# Patient Record
Sex: MALE | Race: WHITE
[De-identification: names, ages, dates, MRNs, and addresses within clinical notes are randomized per-mention and may not be internally consistent; named-entity substitution may affect disease eponyms.]

---

## 2017-11-29 ENCOUNTER — HOSPITAL ENCOUNTER (EMERGENCY)
Dept: HOSPITAL 74 - JER | Age: 42
Discharge: HOME | End: 2017-11-29
Payer: COMMERCIAL

## 2017-11-29 VITALS — BODY MASS INDEX: 36.2 KG/M2

## 2017-11-29 VITALS — DIASTOLIC BLOOD PRESSURE: 76 MMHG | SYSTOLIC BLOOD PRESSURE: 134 MMHG | HEART RATE: 69 BPM
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
## 2017-11-29 DIAGNOSIS — M54.5: Primary | ICD-10-CM
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
## 2017-11-29 LAB
APPEARANCE UR: CLEAR
BILIRUB UR STRIP.AUTO-MCNC: (no result) MG/DL
COLOR UR: YELLOW
KETONES UR QL STRIP: NEGATIVE
NITRITE UR QL STRIP: NEGATIVE
PH UR: 5.5 [PH] (ref 5–8)
PROT UR QL STRIP: NEGATIVE
PROT UR QL STRIP: NEGATIVE
RBC # UR STRIP: NEGATIVE /UL
SP GR UR: >= 1.03 (ref 1–1.03)
UROBILINOGEN UR STRIP-MCNC: 0.2 MG/DL (ref 0.2–1)

## 2017-11-29 NOTE — PDOC
History of Present Illness





- General


Chief Complaint: Back Pain


Stated Complaint: BACK PAIN


Time Seen by Provider: 11/29/17 05:45


History Source: Patient


Exam Limitations: No Limitations





- History of Present Illness


Initial Comments: 





11/29/17 05:58





43yo Male patient w/ PmHx: Lumbar disc fusion and IBS presents to ED c/o back 

pain. Patient states while at work this morning, he was carrying luggage when 

he lost his balance due to his left leg "giving out." Patient states he was 

able to break his fall, but felt a pop in his back and is worried about the 

hardware. He denies any other complaints at this time.





Orthopedic Surgeon: Dr. Stevens ([facility]).





Occurred: reports: just prior to arrival.  denies: this morning, this afternoon

, this evening, yesterday, last week, other


Severity: reports: moderate.  denies: mild, severe


Pain Location: reports: back.  denies: none, abdomen, chest, face, head, lower 

extremity, mouth, neck, other, pelvis, upper extremity


Method of Injury: Yes: fall.  No: unknown, assault, direct blow, motor vehicle 

crash, other


Modifying Factors: improves with: rest.  worse with: None, cold therapy, 

immobilization, pain medication, other


Associated Symptoms (Fall): denies symptoms





Past History





- Travel


Traveled outside of the country in the last 30 days: No


Close contact w/someone who was outside of country & ill: No





- Past Medical History


Allergies/Adverse Reactions: 


 Allergies











Allergy/AdvReac Type Severity Reaction Status Date / Time


 


No Known Allergies Allergy   Verified 11/29/17 05:14











Home Medications: 


Ambulatory Orders





Fenofibrate [Lipofen] 150 mg PO DAILY 11/29/17 


Insulin (Levemir) [Levemir Vial] 28 unit SQ DAILY 11/29/17 


Metformin HCl [Metformin HCl ER] 1,000 mg PO DAILY 11/29/17 








COPD: No





- Immunization History


Immunization Up to Date: Yes





- Suicide/Smoking/Psychosocial Hx


Smoking History: Never smoked


Have you smoked in the past 12 months: No


Information on smoking cessation initiated: No


Hx Alcohol Use: No


Drug/Substance Use Hx: No


Substance Use Type: None





Trauma Specific PMHX





- Complaint Specific PMHX


Arthritis: No


Back Injury: No


Neck Injury: No


Hx Sacro Iliac Joint Dysfunction: No





**Review of Systems





- Review of Systems


Able to Perform ROS?: Yes


Is the patient limited English proficient: No


Musculoskeletal: Yes: Back Pain


All Other Systems: Reviewed and Negative





*Physical Exam





- Vital Signs


 Last Vital Signs











Temp Pulse Resp BP Pulse Ox


 


    77   14   126/84   98 


 


    11/29/17 05:14  11/29/17 05:14  11/29/17 05:14  11/29/17 05:14














- Physical Exam


General Appearance: Yes: Nourished, Appropriately Dressed, Moderate Distress.  

No: Apparent Distress, Mild Distress, Severe Distress


Neck: positive: Trachea midline, Supple.  negative: Rigid, Lymphadenopathy (R), 

Lymphadenopathy (L), Tender lateral, Tender midline, Thyromegaly


Respiratory/Chest: positive: Lungs Clear, Normal Breath Sounds.  negative: 

Chest Tender, Respiratory Distress, Accessory Muscle Use, Labored Respiration, 

Rapid RR, Paradoxal Breathing, Rhonchi, Stridor, Wheezing


Cardiovascular: positive: Regular Rhythm, Regular Rate


Gastrointestinal/Abdominal: positive: Normal Bowel Sounds, Soft, Distended.  

negative: Protuberent, Guarding, Rebound, Tenderness, Hernia, Mass


Musculoskeletal: positive: Normal Inspection.  negative: CVA Tenderness, 

Vertebral Tenderness


Extremity: positive: Normal Capillary Refill, Normal Inspection, Normal Range 

of Motion.  negative: Pedal Edema, Swelling, Calf Tenderness, Erythema, 

Inflammation


Integumentary: positive: Normal Color, Dry, Warm


Neurologic: positive: CNs II-XII NML intact, Fully Oriented, Alert, Normal Mood/

Affect, Normal Response, Motor Strength 5/5





ED Treatment Course





- RADIOLOGY


Radiology Studies Ordered: 














 Category Date Time Status


 


 SPINE-LUMBAR ONLY [RAD] Stat Radiology  11/29/17 05:57 Ordered

## 2017-11-29 NOTE — PDOC
*Physical Exam





- Vital Signs


 Last Vital Signs











Temp Pulse Resp BP Pulse Ox


 


    77   14   126/84   98 


 


    11/29/17 05:14  11/29/17 05:14  11/29/17 05:14  11/29/17 05:14














- Physical Exam


General Appearance: Yes: Appropriately Dressed.  No: Apparent Distress


HEENT: positive: Normal Voice


Neck: positive: Supple


Respiratory/Chest: negative: Respiratory Distress


Gastrointestinal/Abdominal: positive: Soft.  negative: Tender


Musculoskeletal: positive: Vertebral Tenderness.  negative: CVA Tenderness


Extremity: positive: Normal Inspection


Integumentary: positive: Dry, Warm


Neurologic: positive: Fully Oriented, Alert, Normal Mood/Affect





ED Treatment Course





- Medications


Given in the ED: 


ED Medications














Discontinued Medications














Generic Name Dose Route Start Last Admin





  Trade Name Freq  PRN Reason Stop Dose Admin


 


Methocarbamol  500 mg  11/29/17 05:58  11/29/17 06:29





  Robaxin -  PO  11/29/17 05:59  500 mg





  ONCE ONE   Administration


 


Tramadol HCl  50 mg  11/29/17 05:57  11/29/17 06:29





  Ultram -  PO  11/29/17 05:58  50 mg





  ONCE ONE   Administration














Medical Decision Making





- Medical Decision Making





11/29/17 07:30





Received signout at 7 AM





Patient is a 42-year-old male with chronic back pain status post lumbar fusion 

a year ago resents with acute on chronic back pain in the setting of heavy 

lifting while at work this am.  No evidence of cauda equina on exam as per 

prior team.  Patient given tramadol for pain with x-ray pending. has f/u with  

his ortho spine MD next week





11/29/17 10:09


XR w/ no acute pathology. Pt reports feeling better but sleepy with meds so 

wants to rest in ED a little while. On discharge, will give rx for pain meds. 

has ortho f/u on Tuesday 11/29/17 10:57


Pt ready to be discharged





*DC/Admit/Observation/Transfer


Diagnosis at time of Disposition: 


Back pain


Qualifiers:


 Back pain location: low back pain Chronicity: acute Back pain laterality: 

unspecified Sciatica presence: without sciatica Qualified Code(s): M54.5 - Low 

back pain








- Discharge Dispostion


Disposition: HOME


Condition at time of disposition: Improved





- Prescriptions


Prescriptions: 


Tramadol HCl 50 mg PO Q6H #20 tablet MDD 200mg





- Referrals





- Patient Instructions


Printed Discharge Instructions:  Low Back Pain


Additional Instructions: 


Take medications as directed and follow-up with your orthopedic next week





- Post Discharge Activity


Forms/Work/School Notes:  Back to Work

## 2023-09-07 VITALS
OXYGEN SATURATION: 96 % | SYSTOLIC BLOOD PRESSURE: 131 MMHG | HEART RATE: 88 BPM | RESPIRATION RATE: 16 BRPM | HEIGHT: 64 IN | DIASTOLIC BLOOD PRESSURE: 78 MMHG | WEIGHT: 221.12 LBS | TEMPERATURE: 98 F

## 2023-09-07 RX ORDER — INFLUENZA VIRUS VACCINE 15; 15; 15; 15 UG/.5ML; UG/.5ML; UG/.5ML; UG/.5ML
0.5 SUSPENSION INTRAMUSCULAR ONCE
Refills: 0 | Status: DISCONTINUED | OUTPATIENT
Start: 2023-09-08 | End: 2023-09-15

## 2023-09-07 RX ORDER — POVIDONE-IODINE 5 %
1 AEROSOL (ML) TOPICAL ONCE
Refills: 0 | Status: COMPLETED | OUTPATIENT
Start: 2023-09-08 | End: 2023-09-08

## 2023-09-07 NOTE — H&P ADULT - HISTORY OF PRESENT ILLNESS
48yoM with right knee pain x  Presents today for right TKA  48yoM with right knee pain x 6 years. He says that he was injured at work in 2017 when working at a new [employer] learning how to work the [employer]. He had a scope of his knee within the 2 years after his injury and reports it did not help. He says that he came to Dr. Butcher as a second opinion who recommended a knee replacement. He ambulates without the use of a cane or walker. Pain persists despite conservative management. He takes extra strength Tylenol for  pain and oxycodone 5mg every so often for severe pain. He has a pain management doctor that he last say 1 day ago who manages his back pain from history of spinal fusions, hip pain and knee pain. Denies numbness or tingling of the right leg. He reports numbness in the left leg since his spinal fusion.     History of stents placed may 2020 on prasugrel 10mg last dose was taken 5 days ago per cards and pcp.     Presents today for right TKA  48yoM with right knee pain x 6 years. 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 He had a scope of his knee within the 2 years after his injury and reports it did not help. He says that he came to Dr. Butcher as a second opinion who recommended a knee replacement. He ambulates without the use of a cane or walker. Pain persists despite conservative management. He takes extra strength Tylenol for  pain and oxycodone 5mg every so often for severe pain. He has a pain management doctor that he last say 1 day ago who manages his back pain from history of spinal fusions, hip pain and knee pain. Denies numbness or tingling of the right leg. He reports numbness in the left leg since his spinal fusion.     History of stents placed may 2020 on prasugrel 10mg last dose was taken 5 days ago per cards and pcp. Denies history of seizures or blood clots. Denies chest pain, sob or nausea this morning.     Presents today for right TKA

## 2023-09-07 NOTE — H&P ADULT - NSHPSOCIALHISTORY_GEN_ALL_CORE
denies smoking history, recreational drug use and etoh use. reports he lives in a house with a smoker and expose to second hand smoke.

## 2023-09-07 NOTE — H&P ADULT - NSICDXPASTMEDICALHX_GEN_ALL_CORE_FT
PAST MEDICAL HISTORY:  Anxiety & depression    CAD (coronary artery disease) 2 stents    Diabetes     GERD (gastroesophageal reflux disease)     Morbid obesity

## 2023-09-07 NOTE — H&P ADULT - NSHPPHYSICALEXAM_GEN_ALL_CORE
PE: Decreased ROM to right knee secondary to pain    Rest of PE per medical clearance Gen: 48M NAD  MSK: Decreased right knee ROM secondary to pain  Skin without erythema, ecchymosis, abrasions or lesions  Calves soft, nontender bilaterally   Sensation intact to patient baseline reports decrease sensation of left leg from back to toes from prior spinal fusion  Pulses: +2DP palpable, brisk capillary refill  EHL/FHL/TA/GS 4+/5 bilaterally limited 2/2 pain     Rest of PE per MD clearance

## 2023-09-07 NOTE — H&P ADULT - NSHPLABSRESULTS_GEN_ALL_CORE
Preop CBC, BMP, PT/PTT/INR, UA within normal limits- reviewed by medical clearance.   Cr 1.11 preop  A1C 7.8 preop  Preop EKG: NSR, reviewed by medical clearance.   Spirometry: WNL per medical clearance    DOS

## 2023-09-07 NOTE — PATIENT PROFILE ADULT - FALL HARM RISK - UNIVERSAL INTERVENTIONS
Bed in lowest position, wheels locked, appropriate side rails in place/Call bell, personal items and telephone in reach/Instruct patient to call for assistance before getting out of bed or chair/Non-slip footwear when patient is out of bed/Thomas to call system/Physically safe environment - no spills, clutter or unnecessary equipment/Purposeful Proactive Rounding/Room/bathroom lighting operational, light cord in reach

## 2023-09-07 NOTE — H&P ADULT - NSICDXPASTSURGICALHX_GEN_ALL_CORE_FT
PAST SURGICAL HISTORY:  Deviated septum     H/O arthroscopy of knee right    H/O hernia repair     H/O lumbosacral spine surgery     History of cholecystectomy     History of tonsillectomy     S/P arthroscopy of left shoulder

## 2023-09-07 NOTE — H&P ADULT - PROBLEM SELECTOR PLAN 1
Admit to Orthopaedic Service.  Presents today for elective right TKA   Pt medically stable and cleared for procedure today by Dr. Galvez, Dr. Doss  Pt last took Effient on ______ Admit to Orthopaedic Service.  Presents today for elective right TKA   Pt medically stable and cleared for procedure today by Dr. Galvez, Dr. Doss  Pt last took Effient on 9/2/23

## 2023-09-08 ENCOUNTER — INPATIENT (INPATIENT)
Facility: HOSPITAL | Age: 48
LOS: 6 days | Discharge: HOME HEALTH | DRG: 470 | End: 2023-09-15
Attending: ORTHOPAEDIC SURGERY | Admitting: ORTHOPAEDIC SURGERY
Payer: OTHER MISCELLANEOUS

## 2023-09-08 DIAGNOSIS — Z98.890 OTHER SPECIFIED POSTPROCEDURAL STATES: Chronic | ICD-10-CM

## 2023-09-08 DIAGNOSIS — Z90.89 ACQUIRED ABSENCE OF OTHER ORGANS: Chronic | ICD-10-CM

## 2023-09-08 DIAGNOSIS — M19.90 UNSPECIFIED OSTEOARTHRITIS, UNSPECIFIED SITE: ICD-10-CM

## 2023-09-08 DIAGNOSIS — Z90.49 ACQUIRED ABSENCE OF OTHER SPECIFIED PARTS OF DIGESTIVE TRACT: Chronic | ICD-10-CM

## 2023-09-08 DIAGNOSIS — K21.9 GASTRO-ESOPHAGEAL REFLUX DISEASE WITHOUT ESOPHAGITIS: ICD-10-CM

## 2023-09-08 DIAGNOSIS — J34.2 DEVIATED NASAL SEPTUM: Chronic | ICD-10-CM

## 2023-09-08 DIAGNOSIS — I25.10 ATHEROSCLEROTIC HEART DISEASE OF NATIVE CORONARY ARTERY WITHOUT ANGINA PECTORIS: ICD-10-CM

## 2023-09-08 DIAGNOSIS — E11.9 TYPE 2 DIABETES MELLITUS WITHOUT COMPLICATIONS: ICD-10-CM

## 2023-09-08 DIAGNOSIS — F41.9 ANXIETY DISORDER, UNSPECIFIED: ICD-10-CM

## 2023-09-08 DIAGNOSIS — M17.11 UNILATERAL PRIMARY OSTEOARTHRITIS, RIGHT KNEE: ICD-10-CM

## 2023-09-08 LAB
GLUCOSE BLDC GLUCOMTR-MCNC: 126 MG/DL — HIGH (ref 70–99)
GLUCOSE BLDC GLUCOMTR-MCNC: 149 MG/DL — HIGH (ref 70–99)
GLUCOSE BLDC GLUCOMTR-MCNC: 154 MG/DL — HIGH (ref 70–99)
GLUCOSE BLDC GLUCOMTR-MCNC: 199 MG/DL — HIGH (ref 70–99)

## 2023-09-08 PROCEDURE — 73560 X-RAY EXAM OF KNEE 1 OR 2: CPT | Mod: 26,RT

## 2023-09-08 DEVICE — BASEPLATE TIB TRIATHLON TRITAN SZ 4: Type: IMPLANTABLE DEVICE | Site: RIGHT | Status: FUNCTIONAL

## 2023-09-08 DEVICE — MAKO BONE PIN 4MM X 110MM: Type: IMPLANTABLE DEVICE | Site: RIGHT | Status: FUNCTIONAL

## 2023-09-08 DEVICE — INSERT TIB BEARING CS X3 SZ 4 9MM: Type: IMPLANTABLE DEVICE | Site: RIGHT | Status: FUNCTIONAL

## 2023-09-08 DEVICE — COMP FEM TRIATHLON CR SZ3 RT: Type: IMPLANTABLE DEVICE | Site: RIGHT | Status: FUNCTIONAL

## 2023-09-08 RX ORDER — ATORVASTATIN CALCIUM 80 MG/1
40 TABLET, FILM COATED ORAL AT BEDTIME
Refills: 0 | Status: DISCONTINUED | OUTPATIENT
Start: 2023-09-08 | End: 2023-09-15

## 2023-09-08 RX ORDER — DEXTROSE 50 % IN WATER 50 %
25 SYRINGE (ML) INTRAVENOUS ONCE
Refills: 0 | Status: DISCONTINUED | OUTPATIENT
Start: 2023-09-08 | End: 2023-09-08

## 2023-09-08 RX ORDER — HYDROMORPHONE HYDROCHLORIDE 2 MG/ML
0.5 INJECTION INTRAMUSCULAR; INTRAVENOUS; SUBCUTANEOUS
Refills: 0 | Status: DISCONTINUED | OUTPATIENT
Start: 2023-09-08 | End: 2023-09-11

## 2023-09-08 RX ORDER — FENOFIBRATE,MICRONIZED 130 MG
1 CAPSULE ORAL
Refills: 0 | DISCHARGE

## 2023-09-08 RX ORDER — INSULIN LISPRO 100/ML
VIAL (ML) SUBCUTANEOUS
Refills: 0 | Status: DISCONTINUED | OUTPATIENT
Start: 2023-09-08 | End: 2023-09-08

## 2023-09-08 RX ORDER — OXYCODONE HYDROCHLORIDE 5 MG/1
10 TABLET ORAL EVERY 4 HOURS
Refills: 0 | Status: DISCONTINUED | OUTPATIENT
Start: 2023-09-08 | End: 2023-09-10

## 2023-09-08 RX ORDER — SODIUM CHLORIDE 9 MG/ML
1000 INJECTION, SOLUTION INTRAVENOUS
Refills: 0 | Status: DISCONTINUED | OUTPATIENT
Start: 2023-09-08 | End: 2023-09-08

## 2023-09-08 RX ORDER — CELECOXIB 200 MG/1
200 CAPSULE ORAL EVERY 12 HOURS
Refills: 0 | Status: DISCONTINUED | OUTPATIENT
Start: 2023-09-09 | End: 2023-09-15

## 2023-09-08 RX ORDER — GLUCAGON INJECTION, SOLUTION 0.5 MG/.1ML
1 INJECTION, SOLUTION SUBCUTANEOUS ONCE
Refills: 0 | Status: DISCONTINUED | OUTPATIENT
Start: 2023-09-08 | End: 2023-09-15

## 2023-09-08 RX ORDER — POLYETHYLENE GLYCOL 3350 17 G/17G
17 POWDER, FOR SOLUTION ORAL AT BEDTIME
Refills: 0 | Status: DISCONTINUED | OUTPATIENT
Start: 2023-09-08 | End: 2023-09-15

## 2023-09-08 RX ORDER — SENNA PLUS 8.6 MG/1
2 TABLET ORAL AT BEDTIME
Refills: 0 | Status: DISCONTINUED | OUTPATIENT
Start: 2023-09-08 | End: 2023-09-15

## 2023-09-08 RX ORDER — INSULIN LISPRO 100/ML
VIAL (ML) SUBCUTANEOUS
Refills: 0 | Status: DISCONTINUED | OUTPATIENT
Start: 2023-09-08 | End: 2023-09-15

## 2023-09-08 RX ORDER — ONDANSETRON 8 MG/1
4 TABLET, FILM COATED ORAL EVERY 6 HOURS
Refills: 0 | Status: DISCONTINUED | OUTPATIENT
Start: 2023-09-08 | End: 2023-09-15

## 2023-09-08 RX ORDER — DEXTROSE 50 % IN WATER 50 %
25 SYRINGE (ML) INTRAVENOUS ONCE
Refills: 0 | Status: DISCONTINUED | OUTPATIENT
Start: 2023-09-08 | End: 2023-09-15

## 2023-09-08 RX ORDER — HYDROMORPHONE HYDROCHLORIDE 2 MG/ML
0.5 INJECTION INTRAMUSCULAR; INTRAVENOUS; SUBCUTANEOUS
Refills: 0 | Status: DISCONTINUED | OUTPATIENT
Start: 2023-09-08 | End: 2023-09-10

## 2023-09-08 RX ORDER — ACETAMINOPHEN 500 MG
1000 TABLET ORAL EVERY 8 HOURS
Refills: 0 | Status: DISCONTINUED | OUTPATIENT
Start: 2023-09-08 | End: 2023-09-15

## 2023-09-08 RX ORDER — PRASUGREL 5 MG/1
10 TABLET, FILM COATED ORAL DAILY
Refills: 0 | Status: DISCONTINUED | OUTPATIENT
Start: 2023-09-09 | End: 2023-09-15

## 2023-09-08 RX ORDER — PANTOPRAZOLE SODIUM 20 MG/1
40 TABLET, DELAYED RELEASE ORAL
Refills: 0 | Status: DISCONTINUED | OUTPATIENT
Start: 2023-09-08 | End: 2023-09-15

## 2023-09-08 RX ORDER — ASPIRIN/CALCIUM CARB/MAGNESIUM 324 MG
81 TABLET ORAL DAILY
Refills: 0 | Status: DISCONTINUED | OUTPATIENT
Start: 2023-09-09 | End: 2023-09-15

## 2023-09-08 RX ORDER — MAGNESIUM HYDROXIDE 400 MG/1
30 TABLET, CHEWABLE ORAL DAILY
Refills: 0 | Status: DISCONTINUED | OUTPATIENT
Start: 2023-09-08 | End: 2023-09-15

## 2023-09-08 RX ORDER — INSULIN DETEMIR 100/ML (3)
80 INSULIN PEN (ML) SUBCUTANEOUS AT BEDTIME
Refills: 0 | Status: DISCONTINUED | OUTPATIENT
Start: 2023-09-08 | End: 2023-09-11

## 2023-09-08 RX ORDER — OXYCODONE HYDROCHLORIDE 5 MG/1
5 TABLET ORAL EVERY 4 HOURS
Refills: 0 | Status: DISCONTINUED | OUTPATIENT
Start: 2023-09-08 | End: 2023-09-11

## 2023-09-08 RX ORDER — DEXTROSE 50 % IN WATER 50 %
15 SYRINGE (ML) INTRAVENOUS ONCE
Refills: 0 | Status: DISCONTINUED | OUTPATIENT
Start: 2023-09-08 | End: 2023-09-15

## 2023-09-08 RX ORDER — ACETAMINOPHEN 500 MG
1000 TABLET ORAL ONCE
Refills: 0 | Status: COMPLETED | OUTPATIENT
Start: 2023-09-08 | End: 2023-09-08

## 2023-09-08 RX ORDER — HYDROMORPHONE HYDROCHLORIDE 2 MG/ML
0.5 INJECTION INTRAMUSCULAR; INTRAVENOUS; SUBCUTANEOUS EVERY 4 HOURS
Refills: 0 | Status: COMPLETED | OUTPATIENT
Start: 2023-09-08 | End: 2023-09-15

## 2023-09-08 RX ORDER — CHLORHEXIDINE GLUCONATE 213 G/1000ML
1 SOLUTION TOPICAL ONCE
Refills: 0 | Status: COMPLETED | OUTPATIENT
Start: 2023-09-08 | End: 2023-09-08

## 2023-09-08 RX ORDER — OMEPRAZOLE 10 MG/1
1 CAPSULE, DELAYED RELEASE ORAL
Refills: 0 | DISCHARGE

## 2023-09-08 RX ORDER — CEFAZOLIN SODIUM 1 G
2000 VIAL (EA) INJECTION EVERY 8 HOURS
Refills: 0 | Status: COMPLETED | OUTPATIENT
Start: 2023-09-08 | End: 2023-09-09

## 2023-09-08 RX ORDER — HYDROMORPHONE HYDROCHLORIDE 2 MG/ML
0.5 INJECTION INTRAMUSCULAR; INTRAVENOUS; SUBCUTANEOUS EVERY 4 HOURS
Refills: 0 | Status: DISCONTINUED | OUTPATIENT
Start: 2023-09-08 | End: 2023-09-08

## 2023-09-08 RX ORDER — DEXTROSE 50 % IN WATER 50 %
15 SYRINGE (ML) INTRAVENOUS ONCE
Refills: 0 | Status: DISCONTINUED | OUTPATIENT
Start: 2023-09-08 | End: 2023-09-08

## 2023-09-08 RX ORDER — SODIUM CHLORIDE 9 MG/ML
1000 INJECTION, SOLUTION INTRAVENOUS
Refills: 0 | Status: DISCONTINUED | OUTPATIENT
Start: 2023-09-08 | End: 2023-09-15

## 2023-09-08 RX ORDER — DEXTROSE 50 % IN WATER 50 %
12.5 SYRINGE (ML) INTRAVENOUS ONCE
Refills: 0 | Status: DISCONTINUED | OUTPATIENT
Start: 2023-09-08 | End: 2023-09-15

## 2023-09-08 RX ORDER — SODIUM CHLORIDE 9 MG/ML
1000 INJECTION, SOLUTION INTRAVENOUS
Refills: 0 | Status: DISCONTINUED | OUTPATIENT
Start: 2023-09-09 | End: 2023-09-11

## 2023-09-08 RX ORDER — AMLODIPINE BESYLATE 2.5 MG/1
5 TABLET ORAL DAILY
Refills: 0 | Status: DISCONTINUED | OUTPATIENT
Start: 2023-09-08 | End: 2023-09-15

## 2023-09-08 RX ORDER — ACETAMINOPHEN 500 MG
1000 TABLET ORAL ONCE
Refills: 0 | Status: DISCONTINUED | OUTPATIENT
Start: 2023-09-08 | End: 2023-09-08

## 2023-09-08 RX ORDER — DEXTROSE 50 % IN WATER 50 %
12.5 SYRINGE (ML) INTRAVENOUS ONCE
Refills: 0 | Status: DISCONTINUED | OUTPATIENT
Start: 2023-09-08 | End: 2023-09-08

## 2023-09-08 RX ORDER — ACETAMINOPHEN 500 MG
1000 TABLET ORAL EVERY 8 HOURS
Refills: 0 | Status: DISCONTINUED | OUTPATIENT
Start: 2023-09-08 | End: 2023-09-08

## 2023-09-08 RX ADMIN — OXYCODONE HYDROCHLORIDE 10 MILLIGRAM(S): 5 TABLET ORAL at 17:35

## 2023-09-08 RX ADMIN — OXYCODONE HYDROCHLORIDE 10 MILLIGRAM(S): 5 TABLET ORAL at 20:06

## 2023-09-08 RX ADMIN — HYDROMORPHONE HYDROCHLORIDE 0.5 MILLIGRAM(S): 2 INJECTION INTRAMUSCULAR; INTRAVENOUS; SUBCUTANEOUS at 18:09

## 2023-09-08 RX ADMIN — HYDROMORPHONE HYDROCHLORIDE 0.5 MILLIGRAM(S): 2 INJECTION INTRAMUSCULAR; INTRAVENOUS; SUBCUTANEOUS at 21:53

## 2023-09-08 RX ADMIN — Medication 100 MILLIGRAM(S): at 15:56

## 2023-09-08 RX ADMIN — CHLORHEXIDINE GLUCONATE 1 APPLICATION(S): 213 SOLUTION TOPICAL at 07:36

## 2023-09-08 RX ADMIN — OXYCODONE HYDROCHLORIDE 10 MILLIGRAM(S): 5 TABLET ORAL at 17:03

## 2023-09-08 RX ADMIN — HYDROMORPHONE HYDROCHLORIDE 0.5 MILLIGRAM(S): 2 INJECTION INTRAMUSCULAR; INTRAVENOUS; SUBCUTANEOUS at 22:10

## 2023-09-08 RX ADMIN — Medication 400 MILLIGRAM(S): at 14:37

## 2023-09-08 RX ADMIN — Medication 2: at 13:04

## 2023-09-08 RX ADMIN — Medication 1 APPLICATION(S): at 07:36

## 2023-09-08 RX ADMIN — OXYCODONE HYDROCHLORIDE 10 MILLIGRAM(S): 5 TABLET ORAL at 21:10

## 2023-09-08 RX ADMIN — Medication 1000 MILLIGRAM(S): at 15:13

## 2023-09-08 RX ADMIN — HYDROMORPHONE HYDROCHLORIDE 0.5 MILLIGRAM(S): 2 INJECTION INTRAMUSCULAR; INTRAVENOUS; SUBCUTANEOUS at 18:25

## 2023-09-08 NOTE — PRE-ANESTHESIA EVALUATION ADULT - NSANTHOSAYNRD_GEN_A_CORE
No. ALEXA screening performed.  STOP BANG Legend: 0-2 = LOW Risk; 3-4 = INTERMEDIATE Risk; 5-8 = HIGH Risk

## 2023-09-08 NOTE — PRE-ANESTHESIA EVALUATION ADULT - NSANTHAIRWAYFT_ENT_ALL_CORE
Inter-incisor distance: > 3cm  HMD: > 6cm  Submandibular space: normal compliance, large amount of excess tissue  Neck:  normal length and thickness  Head/Neck:  limited ROR

## 2023-09-08 NOTE — PHYSICAL THERAPY INITIAL EVALUATION ADULT - MANUAL MUSCLE TESTING RESULTS, REHAB EVAL
LLE grossly 5/5, right DF/PF grossly 4/5, right hip flexion 3+/5, did not MMT right knee d/t post-operative status

## 2023-09-08 NOTE — PHYSICAL THERAPY INITIAL EVALUATION ADULT - ADDITIONAL COMMENTS
Prior to admission pt was IND with short distance ambulation, previously limited d/t knee and back pain. Patient lives in a house with his spouse and 5 steps to enter, walk in shower. Does not own any DME.

## 2023-09-08 NOTE — PRE-ANESTHESIA EVALUATION ADULT - NSANTHADDINFOFT_GEN_ALL_CORE
pt on prasugrel, stopped 5 days ago.  Spinal anesthetic contraindicated.  Pt also prefers general anesthesia

## 2023-09-08 NOTE — PHYSICAL THERAPY INITIAL EVALUATION ADULT - GENERAL OBSERVATIONS, REHAB EVAL
Patient received semi-lyn in bed in NAD on 2L NC, +SCDs, +tele, +Heplock. Cleared by MICHAEL Haro. Agreeable to PT.

## 2023-09-08 NOTE — PHYSICAL THERAPY INITIAL EVALUATION ADULT - WEIGHT-BEARING RESTRICTIONS: GAIT, REHAB EVAL
however, patient self selecting to perform toe touch WBing d/t pain; constant cueing to promote increased WBing through RLE/weight-bearing as tolerated

## 2023-09-08 NOTE — PROGRESS NOTE ADULT - SUBJECTIVE AND OBJECTIVE BOX
Ortho Post Op Check    Procedure: Right TKR  Surgeon: Dr. Butcher    Patient seen and examined in PACU bed. Pt endorsing incisional pain.   Denies CP, SOB, N/V, numbness/tingling     Vital Signs Last 24 Hrs  T(C): 36.2 (09-08-23 @ 11:35), Max: 36.2 (09-08-23 @ 11:35)  T(F): 97.2 (09-08-23 @ 11:35), Max: 97.2 (09-08-23 @ 11:35)  HR: 88 (09-08-23 @ 14:10) (88 - 114)  BP: 119/69 (09-08-23 @ 14:10) (107/65 - 152/79)  BP(mean): 89 (09-08-23 @ 14:10) (82 - 107)  RR: 18 (09-08-23 @ 14:10) (13 - 18)  SpO2: 97% (09-08-23 @ 14:10) (94% - 99%)  I&O's Summary      General: Pt Alert and oriented, NAD  DSG C/D/I - Aquacel to right knee, ice packs in place  Pulses: 2+ DP pulses palpable bilaterally, skin wwp, cap refill brisk.   Sensation: Decreased sensation to left lower extremity which is consistent with pre-op exam. Otherwise SILT.   Motor: EHL/FHL/TA/GS 5/5 bilaterally              Post-op X-Ray: IMPRESSION: Frontal and lateral views of the right knee demonstrates total arthroplasty in appropriate alignment with overlying surgical staples with expected air within the surrounding soft tissues    A/P: 47yo Male POD#0 s/p Right TKR  - Stable  - Per anesthesia, admit to tele for likely undiagnosed ALEXA. Patient sating 95% on 4L NC  - Pain Control  - DVT ppx: ASA and Prasugrel POD#1  - Post op abx: Ancef  - PT, WBS: WBAT  - Dispo: Pending PT eval    Ortho Pager [phone]

## 2023-09-08 NOTE — PHYSICAL THERAPY INITIAL EVALUATION ADULT - PERTINENT HX OF CURRENT PROBLEM, REHAB EVAL
Patient is a 47 yo M with right knee pain x 6 years. 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 He had a scope of his knee within the 2 years after his injury and reports it did not help. He says that he came to Dr. Butcher as a second opinion who recommended a knee replacement. He ambulates without the use of a cane or walker. Now POD #0 s/p right total knee arthroplasty on 9/8/23.

## 2023-09-09 LAB
A1C WITH ESTIMATED AVERAGE GLUCOSE RESULT: 7.5 % — HIGH (ref 4–5.6)
ANION GAP SERPL CALC-SCNC: 7 MMOL/L — SIGNIFICANT CHANGE UP (ref 5–17)
BUN SERPL-MCNC: 14 MG/DL — SIGNIFICANT CHANGE UP (ref 7–23)
CALCIUM SERPL-MCNC: 8.6 MG/DL — SIGNIFICANT CHANGE UP (ref 8.4–10.5)
CHLORIDE SERPL-SCNC: 101 MMOL/L — SIGNIFICANT CHANGE UP (ref 96–108)
CO2 SERPL-SCNC: 29 MMOL/L — SIGNIFICANT CHANGE UP (ref 22–31)
CREAT SERPL-MCNC: 0.99 MG/DL — SIGNIFICANT CHANGE UP (ref 0.5–1.3)
EGFR: 95 ML/MIN/1.73M2 — SIGNIFICANT CHANGE UP
ESTIMATED AVERAGE GLUCOSE: 169 MG/DL — HIGH (ref 68–114)
GLUCOSE BLDC GLUCOMTR-MCNC: 142 MG/DL — HIGH (ref 70–99)
GLUCOSE BLDC GLUCOMTR-MCNC: 152 MG/DL — HIGH (ref 70–99)
GLUCOSE BLDC GLUCOMTR-MCNC: 178 MG/DL — HIGH (ref 70–99)
GLUCOSE BLDC GLUCOMTR-MCNC: 187 MG/DL — HIGH (ref 70–99)
GLUCOSE SERPL-MCNC: 166 MG/DL — HIGH (ref 70–99)
HCT VFR BLD CALC: 37.9 % — LOW (ref 39–50)
HGB BLD-MCNC: 12.5 G/DL — LOW (ref 13–17)
MCHC RBC-ENTMCNC: 27.7 PG — SIGNIFICANT CHANGE UP (ref 27–34)
MCHC RBC-ENTMCNC: 33 GM/DL — SIGNIFICANT CHANGE UP (ref 32–36)
MCV RBC AUTO: 84 FL — SIGNIFICANT CHANGE UP (ref 80–100)
NRBC # BLD: 0 /100 WBCS — SIGNIFICANT CHANGE UP (ref 0–0)
PLATELET # BLD AUTO: 203 K/UL — SIGNIFICANT CHANGE UP (ref 150–400)
POTASSIUM SERPL-MCNC: 4.1 MMOL/L — SIGNIFICANT CHANGE UP (ref 3.5–5.3)
POTASSIUM SERPL-SCNC: 4.1 MMOL/L — SIGNIFICANT CHANGE UP (ref 3.5–5.3)
RBC # BLD: 4.51 M/UL — SIGNIFICANT CHANGE UP (ref 4.2–5.8)
RBC # FLD: 13.4 % — SIGNIFICANT CHANGE UP (ref 10.3–14.5)
SODIUM SERPL-SCNC: 137 MMOL/L — SIGNIFICANT CHANGE UP (ref 135–145)
WBC # BLD: 9.98 K/UL — SIGNIFICANT CHANGE UP (ref 3.8–10.5)
WBC # FLD AUTO: 9.98 K/UL — SIGNIFICANT CHANGE UP (ref 3.8–10.5)

## 2023-09-09 RX ADMIN — HYDROMORPHONE HYDROCHLORIDE 0.5 MILLIGRAM(S): 2 INJECTION INTRAMUSCULAR; INTRAVENOUS; SUBCUTANEOUS at 01:40

## 2023-09-09 RX ADMIN — HYDROMORPHONE HYDROCHLORIDE 0.5 MILLIGRAM(S): 2 INJECTION INTRAMUSCULAR; INTRAVENOUS; SUBCUTANEOUS at 22:00

## 2023-09-09 RX ADMIN — HYDROMORPHONE HYDROCHLORIDE 0.5 MILLIGRAM(S): 2 INJECTION INTRAMUSCULAR; INTRAVENOUS; SUBCUTANEOUS at 14:45

## 2023-09-09 RX ADMIN — HYDROMORPHONE HYDROCHLORIDE 0.5 MILLIGRAM(S): 2 INJECTION INTRAMUSCULAR; INTRAVENOUS; SUBCUTANEOUS at 17:56

## 2023-09-09 RX ADMIN — PANTOPRAZOLE SODIUM 40 MILLIGRAM(S): 20 TABLET, DELAYED RELEASE ORAL at 06:23

## 2023-09-09 RX ADMIN — AMLODIPINE BESYLATE 5 MILLIGRAM(S): 2.5 TABLET ORAL at 06:23

## 2023-09-09 RX ADMIN — HYDROMORPHONE HYDROCHLORIDE 0.5 MILLIGRAM(S): 2 INJECTION INTRAMUSCULAR; INTRAVENOUS; SUBCUTANEOUS at 18:20

## 2023-09-09 RX ADMIN — Medication 1000 MILLIGRAM(S): at 14:18

## 2023-09-09 RX ADMIN — HYDROMORPHONE HYDROCHLORIDE 0.5 MILLIGRAM(S): 2 INJECTION INTRAMUSCULAR; INTRAVENOUS; SUBCUTANEOUS at 06:37

## 2023-09-09 RX ADMIN — HYDROMORPHONE HYDROCHLORIDE 0.5 MILLIGRAM(S): 2 INJECTION INTRAMUSCULAR; INTRAVENOUS; SUBCUTANEOUS at 21:12

## 2023-09-09 RX ADMIN — HYDROMORPHONE HYDROCHLORIDE 0.5 MILLIGRAM(S): 2 INJECTION INTRAMUSCULAR; INTRAVENOUS; SUBCUTANEOUS at 09:50

## 2023-09-09 RX ADMIN — CELECOXIB 200 MILLIGRAM(S): 200 CAPSULE ORAL at 17:59

## 2023-09-09 RX ADMIN — SODIUM CHLORIDE 100 MILLILITER(S): 9 INJECTION, SOLUTION INTRAVENOUS at 01:30

## 2023-09-09 RX ADMIN — PRASUGREL 10 MILLIGRAM(S): 5 TABLET, FILM COATED ORAL at 14:31

## 2023-09-09 RX ADMIN — Medication 81 MILLIGRAM(S): at 14:15

## 2023-09-09 RX ADMIN — HYDROMORPHONE HYDROCHLORIDE 0.5 MILLIGRAM(S): 2 INJECTION INTRAMUSCULAR; INTRAVENOUS; SUBCUTANEOUS at 01:24

## 2023-09-09 RX ADMIN — HYDROMORPHONE HYDROCHLORIDE 0.5 MILLIGRAM(S): 2 INJECTION INTRAMUSCULAR; INTRAVENOUS; SUBCUTANEOUS at 10:15

## 2023-09-09 RX ADMIN — HYDROMORPHONE HYDROCHLORIDE 0.5 MILLIGRAM(S): 2 INJECTION INTRAMUSCULAR; INTRAVENOUS; SUBCUTANEOUS at 14:15

## 2023-09-09 RX ADMIN — HYDROMORPHONE HYDROCHLORIDE 0.5 MILLIGRAM(S): 2 INJECTION INTRAMUSCULAR; INTRAVENOUS; SUBCUTANEOUS at 06:22

## 2023-09-09 RX ADMIN — Medication 100 MILLIGRAM(S): at 01:23

## 2023-09-09 NOTE — DISCHARGE NOTE PROVIDER - NSDCCPTREATMENT_GEN_ALL_CORE_FT
PRINCIPAL PROCEDURE  Procedure: Right total knee replacement  Findings and Treatment: OA      SECONDARY PROCEDURE  Procedure: Right total knee replacement  Findings and Treatment:

## 2023-09-09 NOTE — PROGRESS NOTE ADULT - SUBJECTIVE AND OBJECTIVE BOX
Ortho Note    Pt comfortable without complaints, pain controlled  Denies CP, SOB, N/V, numbness/tingling     Vital Signs Last 24 Hrs  T(C): 37.4 (09-09-23 @ 01:15), Max: 37.4 (09-09-23 @ 01:15)  T(F): 99.3 (09-09-23 @ 01:15), Max: 99.3 (09-09-23 @ 01:15)  HR: 80 (09-09-23 @ 01:15) (80 - 98)  BP: 141/84 (09-09-23 @ 01:15) (126/66 - 141/84)  BP(mean): --  RR: 19 (09-09-23 @ 01:15) (16 - 19)  SpO2: 100% (09-09-23 @ 01:15) (97% - 100%)  I&O's Summary    08 Sep 2023 07:01  -  09 Sep 2023 02:04  --------------------------------------------------------  IN: 900 mL / OUT: 575 mL / NET: 325 mL      General: Pt Alert and oriented, NAD  DSG C/D/I - Aquacel to right knee, ice packs in place  Pulses: 2+ DP pulses palpable bilaterally, skin wwp, cap refill brisk.   Sensation: Decreased sensation to left lower extremity which is consistent with pre-op exam. Otherwise SILT.   Motor: EHL/FHL/TA/GS 5/5 bilaterally      A/P: 49yo Male POD#1s/p Right TKR  - Stable  - Per anesthesia, admit to tele for likely undiagnosed ALEXA. Patient sating 95% on 4L NC  - Pain Control  - DVT ppx: ASA and Prasugrel POD#1  - Post op abx: Ancef  - PT, WBS: WBAT  - Dispo: Pending PT eval    Ortho Pager [phone]

## 2023-09-09 NOTE — DISCHARGE NOTE PROVIDER - NSDCMRMEDTOKEN_GEN_ALL_CORE_FT
amlodipine 5 mg HS:   amLODIPine 5 mg oral tablet: 1 tab(s) orally once a day  aspirin 81 mg oral tablet: 1 tab(s) orally once a day  aspirin 81mg HS:   atorvastatin 40 mg oral tablet: 1 tab(s) orally once a day  atorvastatin 40mg HS:   fenofibrate 160 mg oral tablet: 1 tab(s) orally once a day  fenofibrate 160mg  HS:   glimeperide 2mg daily:   glimepiride 2 mg oral tablet: 1 tab(s) orally once a day  Levemir  80Units SQ HS:   MetFORMIN (Eqv-Fortamet) 1000 mg oral tablet, extended release: 1 tab(s) orally 2 times a day  metformin 1000mg BID:   omeprazole 20 mg oral delayed release tablet: 1 tab(s) orally once a day  Omeprazole 20mg PRN:   prasugrel 10 mg oral tablet: 1 tab(s) orally once a day  prasugrel 10mg HS:    acetaminophen 500 mg oral tablet: 2 tab(s) orally every 8 hours  amLODIPine 5 mg oral tablet: 1 tab(s) orally once a day  aspirin 81 mg oral delayed release tablet: 1 tab(s) orally once a day  atorvastatin 40 mg oral tablet: 1 tab(s) orally once a day (at bedtime)  fenofibrate 160 mg oral tablet: 1 tab(s) orally once a day  gabapentin 300 mg oral capsule: 1 cap(s) orally 3 times a day  linagliptin 5 mg oral tablet: 1 tab(s) orally once a day  metFORMIN 500 mg oral tablet: 1 tab(s) orally 2 times a day  omeprazole 20 mg oral delayed release tablet: 1 tab(s) orally once a day  prasugrel 10 mg oral tablet: 1 tab(s) orally once a day  senna leaf extract oral tablet: 2 tab(s) orally once a day (at bedtime)

## 2023-09-09 NOTE — DISCHARGE NOTE PROVIDER - NSDCCPCAREPLAN_GEN_ALL_CORE_FT
PRINCIPAL DISCHARGE DIAGNOSIS  Diagnosis: Osteoarthritis  Assessment and Plan of Treatment: right TKR

## 2023-09-09 NOTE — DISCHARGE NOTE PROVIDER - HOSPITAL COURSE
Admitted 9/8/23  Surgery- right total knee replacement  Mirta-op Antibiotics  Pain control  DVT prophylaxis  OOB/Physical Therapy   Admitted 9/8/23  Surgery- right total knee replacement  Mirta-op Antibiotics  Pain control  DVT prophylaxis  OOB/Physical Therapy    Endocrine consult-   recs 9-14 hold lantus  metformin 500mg PO BID  tragenta 5mg PO daily Admitted 9/8/23  Surgery- right total knee replacement  Mirta-op Antibiotics  Pain control  DVT prophylaxis  OOB/Physical Therapy    Endocrine consult-  final recommendations   ·  Problem: Diabetes.   ·  Plan: Type 2 diabetes mellitus with hyperglycemia  - Start Lantus 4 units at bedtime.  - Continue lispro moderate dose sliding scale before meals and at bedtime.  - Patient's fingerstick glucose goal is 100-180 mg/dL.    - Consistent carb diet  - For discharge: TBD  - Patient can follow up with his private endocrinologist.

## 2023-09-09 NOTE — DISCHARGE NOTE PROVIDER - CARE PROVIDER_API CALL
Feng Butcher  Sports Medicine  [address] [place] [address], [address]  [place] [address]  Phone: [phone]  Fax: [phone]  Follow Up Time: 2 weeks

## 2023-09-09 NOTE — DISCHARGE NOTE PROVIDER - NSDCFUADDINST_GEN_ALL_CORE_FT
ACTIVITY:     - Weight bear as tolerated with assistive device. No strenuous activity, heavy lifting, driving or returning to work until cleared by MD.     - Apply a cold compress to the surgical site several times daily to reduce pain and swelling. For icing, twenty-minute sessions followed by an hour off is recommended. You should ice as frequently as possible. Ice should NEVER be placed directly on the skin. Wearing compression stockings during the first week after surgery can help reduce swelling in your knee, calf and foot, but is not required.              DRESSING/SHOWERING:     (AQUACEL – brown gel dressing)     - You may shower, your dressing is water-resistant. Do not soak in bathtubs. Remove dressing after postop day 7, then leave incision open to air. You may do this yourself (simply peel it off), or you may ask for assistance from your visiting nurse. Keep your incision clean and dry. Do not pick at your incision. Do not apply creams, ointments or oils to your incision until cleared by your surgeon. Do not soak your incision in sitting water (ie tubs, pools, lakes, etc.) until cleared by your surgeon. Do not scrub the incision – instead, allow soap and water to flow over the incision and then pat it dry with a clean towel.            MEDICATION/ANTICOAGULATION:     -You have been prescribed aspirin, as a preventative to help prevent postoperative blood clots. Please take this medication as prescribed.      - You have been prescribed medications for pain:       - Tylenol for mild to moderate pain. Do not exceed 3,000mg daily.       - For more severe pain, take Tylenol with the addition of narcotic pain medication. Take this medication as prescribed. This medication may cause drowsiness or dizziness. Do not operate machinery. This medication may cause constipation.     - For any additional medications, follow instructions on the bottle.      -Try to have regular bowel movements. Take stool softener or laxative if necessary. You may wish to take Miralax daily until you have regular bowel movements.      - If you have been prescribed Aspirin or an anti-inflammatory, please take prilosec (omeprazole) once a day, before breakfast, until no longer taking Aspirin or anti-inflammatory. This will help protect your stomach.     - If you have a pain management physician, please follow-up with them postoperatively.      - If you experience any negative side effects of your medications, please call your surgeon's office to discuss.           FOLLOW-UP:     - Call to schedule an appt with Dr. Butcher for follow up.     - Please follow-up with your primary care physician or any other specialist you see postoperatively, if needed.      - Contact your doctor or go to the emergency room if you experience: fever greater than 101.5, chills, chest pain, difficulty breathing, redness or excessive drainage around the incision, other concerns.     ACTIVITY:     - Weight bear as tolerated with assistive device. No strenuous activity, heavy lifting, driving or returning to work until cleared by MD.     - Apply a cold compress to the surgical site several times daily to reduce pain and swelling. For icing, twenty-minute sessions followed by an hour off is recommended. You should ice as frequently as possible. Ice should NEVER be placed directly on the skin. Wearing compression stockings during the first week after surgery can help reduce swelling in your knee, calf and foot, but is not required.              DRESSING/SHOWERING:     (AQUACEL – brown gel dressing)     - You may shower, your dressing is water-resistant. Do not soak in bathtubs. Remove dressing after postop day 7, then leave incision open to air. You may do this yourself (simply peel it off), or you may ask for assistance from your visiting nurse. Keep your incision clean and dry. Do not pick at your incision. Do not apply creams, ointments or oils to your incision until cleared by your surgeon. Do not soak your incision in sitting water (ie tubs, pools, lakes, etc.) until cleared by your surgeon. Do not scrub the incision – instead, allow soap and water to flow over the incision and then pat it dry with a clean towel.            MEDICATION/ANTICOAGULATION:     -You have been prescribed aspirin, as a preventative to help prevent postoperative blood clots. Please take this medication as prescribed.      - You have been prescribed medications for pain:       - Tylenol for mild to moderate pain. Do not exceed 3,000mg daily.       - For more severe pain, take Tylenol with the addition of narcotic pain medication. Take this medication as prescribed. This medication may cause drowsiness or dizziness. Do not operate machinery. This medication may cause constipation.     - For any additional medications, follow instructions on the bottle.      -Try to have regular bowel movements. Take stool softener or laxative if necessary. You may wish to take Miralax daily until you have regular bowel movements.      - If you have been prescribed Aspirin or an anti-inflammatory, please take prilosec (omeprazole) once a day, before breakfast, until no longer taking Aspirin or anti-inflammatory. This will help protect your stomach.     - If you have a pain management physician, please follow-up with them postoperatively.      - If you experience any negative side effects of your medications, please call your surgeon's office to discuss.           FOLLOW-UP:     - Call to schedule an appt with Dr. Butcher for follow up.     - Please follow-up with your primary care physician or any other specialist you see postoperatively, if needed.      - Contact your doctor or go to the emergency room if you experience: fever greater than 101.5, chills, chest pain, difficulty breathing, redness or excessive drainage around the incision, other concerns.    Endocrine recommendations:    ACTIVITY:     - Weight bear as tolerated with assistive device. No strenuous activity, heavy lifting, driving or returning to work until cleared by MD.     - Apply a cold compress to the surgical site several times daily to reduce pain and swelling. For icing, twenty-minute sessions followed by an hour off is recommended. You should ice as frequently as possible. Ice should NEVER be placed directly on the skin. Wearing compression stockings during the first week after surgery can help reduce swelling in your knee, calf and foot, but is not required.              DRESSING/SHOWERING:   (AQUACEL – brown gel dressing)     - You may shower, your dressing is water-resistant. Do not soak in bathtubs. Remove dressing after postop day 7, then leave incision open to air. You may do this yourself (simply peel it off), or you may ask for assistance from your visiting nurse. Keep your incision clean and dry. Do not pick at your incision. Do not apply creams, ointments or oils to your incision until cleared by your surgeon. Do not soak your incision in sitting water (ie tubs, pools, lakes, etc.) until cleared by your surgeon. Do not scrub the incision – instead, allow soap and water to flow over the incision and then pat it dry with a clean towel.          MEDICATION/ANTICOAGULATION:   -You have been prescribed aspirin, as a preventative to help prevent postoperative blood clots. Please take this medication as prescribed.    - You have been prescribed medications for pain:     - Tylenol for mild to moderate pain. Do not exceed 3,000mg daily.     - For more severe pain, take Tylenol with the addition of narcotic pain medication. Take this medication as prescribed. This medication may cause drowsiness or dizziness. Do not operate machinery. This medication may cause constipation.   - For any additional medications, follow instructions on the bottle.    -Try to have regular bowel movements. Take stool softener or laxative if necessary. You may wish to take Miralax daily until you have regular bowel movements.    - If you have been prescribed Aspirin or an anti-inflammatory, please take prilosec (omeprazole) once a day, before breakfast, until no longer taking Aspirin or anti-inflammatory. This will help protect your stomach.   - If you have a pain management physician, please follow-up with them postoperatively.    - If you experience any negative side effects of your medications, please call your surgeon's office to discuss.           FOLLOW-UP:   - Call to schedule an appt with Dr. Butcher for follow up.   - Please follow-up with your primary care physician or any other specialist you see postoperatively, if needed.    - Contact your doctor or go to the emergency room if you experience: fever greater than 101.5, chills, chest pain, difficulty breathing, redness or excessive drainage around the incision, other concerns.    Endocrine recommendations:    Continue Metformin 1000mg twice a day and Januvia 100mg daily   Monitor glucose at home and followup with your private endocrinologist if he experiences hyperglycemia for guidance on adjusting medications.   Educated on importance of glucose control for prevention of surgical site infections.  Follow up with his private endocrinologist.

## 2023-09-10 LAB
ANION GAP SERPL CALC-SCNC: 9 MMOL/L — SIGNIFICANT CHANGE UP (ref 5–17)
BUN SERPL-MCNC: 11 MG/DL — SIGNIFICANT CHANGE UP (ref 7–23)
CALCIUM SERPL-MCNC: 8.6 MG/DL — SIGNIFICANT CHANGE UP (ref 8.4–10.5)
CHLORIDE SERPL-SCNC: 102 MMOL/L — SIGNIFICANT CHANGE UP (ref 96–108)
CO2 SERPL-SCNC: 27 MMOL/L — SIGNIFICANT CHANGE UP (ref 22–31)
CREAT SERPL-MCNC: 0.91 MG/DL — SIGNIFICANT CHANGE UP (ref 0.5–1.3)
EGFR: 105 ML/MIN/1.73M2 — SIGNIFICANT CHANGE UP
GLUCOSE BLDC GLUCOMTR-MCNC: 129 MG/DL — HIGH (ref 70–99)
GLUCOSE BLDC GLUCOMTR-MCNC: 135 MG/DL — HIGH (ref 70–99)
GLUCOSE BLDC GLUCOMTR-MCNC: 180 MG/DL — HIGH (ref 70–99)
GLUCOSE SERPL-MCNC: 168 MG/DL — HIGH (ref 70–99)
HCT VFR BLD CALC: 36.6 % — LOW (ref 39–50)
HGB BLD-MCNC: 11.5 G/DL — LOW (ref 13–17)
MCHC RBC-ENTMCNC: 27.3 PG — SIGNIFICANT CHANGE UP (ref 27–34)
MCHC RBC-ENTMCNC: 31.4 GM/DL — LOW (ref 32–36)
MCV RBC AUTO: 86.7 FL — SIGNIFICANT CHANGE UP (ref 80–100)
NRBC # BLD: 0 /100 WBCS — SIGNIFICANT CHANGE UP (ref 0–0)
PLATELET # BLD AUTO: 175 K/UL — SIGNIFICANT CHANGE UP (ref 150–400)
POTASSIUM SERPL-MCNC: 4.3 MMOL/L — SIGNIFICANT CHANGE UP (ref 3.5–5.3)
POTASSIUM SERPL-SCNC: 4.3 MMOL/L — SIGNIFICANT CHANGE UP (ref 3.5–5.3)
RBC # BLD: 4.22 M/UL — SIGNIFICANT CHANGE UP (ref 4.2–5.8)
RBC # FLD: 13.8 % — SIGNIFICANT CHANGE UP (ref 10.3–14.5)
SODIUM SERPL-SCNC: 138 MMOL/L — SIGNIFICANT CHANGE UP (ref 135–145)
WBC # BLD: 10.55 K/UL — HIGH (ref 3.8–10.5)
WBC # FLD AUTO: 10.55 K/UL — HIGH (ref 3.8–10.5)

## 2023-09-10 RX ORDER — KETOROLAC TROMETHAMINE 30 MG/ML
30 SYRINGE (ML) INJECTION EVERY 6 HOURS
Refills: 0 | Status: DISCONTINUED | OUTPATIENT
Start: 2023-09-10 | End: 2023-09-10

## 2023-09-10 RX ORDER — KETOROLAC TROMETHAMINE 30 MG/ML
30 SYRINGE (ML) INJECTION EVERY 6 HOURS
Refills: 0 | Status: DISCONTINUED | OUTPATIENT
Start: 2023-09-10 | End: 2023-09-11

## 2023-09-10 RX ORDER — HYDROMORPHONE HYDROCHLORIDE 2 MG/ML
2 INJECTION INTRAMUSCULAR; INTRAVENOUS; SUBCUTANEOUS EVERY 4 HOURS
Refills: 0 | Status: DISCONTINUED | OUTPATIENT
Start: 2023-09-10 | End: 2023-09-11

## 2023-09-10 RX ADMIN — Medication 1000 MILLIGRAM(S): at 22:23

## 2023-09-10 RX ADMIN — Medication 30 MILLIGRAM(S): at 11:15

## 2023-09-10 RX ADMIN — HYDROMORPHONE HYDROCHLORIDE 0.5 MILLIGRAM(S): 2 INJECTION INTRAMUSCULAR; INTRAVENOUS; SUBCUTANEOUS at 09:15

## 2023-09-10 RX ADMIN — HYDROMORPHONE HYDROCHLORIDE 0.5 MILLIGRAM(S): 2 INJECTION INTRAMUSCULAR; INTRAVENOUS; SUBCUTANEOUS at 01:34

## 2023-09-10 RX ADMIN — HYDROMORPHONE HYDROCHLORIDE 2 MILLIGRAM(S): 2 INJECTION INTRAMUSCULAR; INTRAVENOUS; SUBCUTANEOUS at 23:20

## 2023-09-10 RX ADMIN — HYDROMORPHONE HYDROCHLORIDE 0.5 MILLIGRAM(S): 2 INJECTION INTRAMUSCULAR; INTRAVENOUS; SUBCUTANEOUS at 04:17

## 2023-09-10 RX ADMIN — Medication 1000 MILLIGRAM(S): at 23:20

## 2023-09-10 RX ADMIN — OXYCODONE HYDROCHLORIDE 10 MILLIGRAM(S): 5 TABLET ORAL at 11:11

## 2023-09-10 RX ADMIN — Medication 1000 MILLIGRAM(S): at 15:48

## 2023-09-10 RX ADMIN — HYDROMORPHONE HYDROCHLORIDE 0.5 MILLIGRAM(S): 2 INJECTION INTRAMUSCULAR; INTRAVENOUS; SUBCUTANEOUS at 04:47

## 2023-09-10 RX ADMIN — Medication 30 MILLIGRAM(S): at 10:51

## 2023-09-10 RX ADMIN — HYDROMORPHONE HYDROCHLORIDE 0.5 MILLIGRAM(S): 2 INJECTION INTRAMUSCULAR; INTRAVENOUS; SUBCUTANEOUS at 08:34

## 2023-09-10 RX ADMIN — PRASUGREL 10 MILLIGRAM(S): 5 TABLET, FILM COATED ORAL at 11:11

## 2023-09-10 RX ADMIN — HYDROMORPHONE HYDROCHLORIDE 2 MILLIGRAM(S): 2 INJECTION INTRAMUSCULAR; INTRAVENOUS; SUBCUTANEOUS at 16:18

## 2023-09-10 RX ADMIN — HYDROMORPHONE HYDROCHLORIDE 2 MILLIGRAM(S): 2 INJECTION INTRAMUSCULAR; INTRAVENOUS; SUBCUTANEOUS at 22:23

## 2023-09-10 RX ADMIN — OXYCODONE HYDROCHLORIDE 10 MILLIGRAM(S): 5 TABLET ORAL at 11:45

## 2023-09-10 RX ADMIN — Medication 81 MILLIGRAM(S): at 11:11

## 2023-09-10 RX ADMIN — HYDROMORPHONE HYDROCHLORIDE 2 MILLIGRAM(S): 2 INJECTION INTRAMUSCULAR; INTRAVENOUS; SUBCUTANEOUS at 15:48

## 2023-09-10 RX ADMIN — HYDROMORPHONE HYDROCHLORIDE 0.5 MILLIGRAM(S): 2 INJECTION INTRAMUSCULAR; INTRAVENOUS; SUBCUTANEOUS at 01:04

## 2023-09-10 RX ADMIN — ATORVASTATIN CALCIUM 40 MILLIGRAM(S): 80 TABLET, FILM COATED ORAL at 22:23

## 2023-09-11 LAB
ANION GAP SERPL CALC-SCNC: 9 MMOL/L — SIGNIFICANT CHANGE UP (ref 5–17)
BUN SERPL-MCNC: 15 MG/DL — SIGNIFICANT CHANGE UP (ref 7–23)
CALCIUM SERPL-MCNC: 8.6 MG/DL — SIGNIFICANT CHANGE UP (ref 8.4–10.5)
CHLORIDE SERPL-SCNC: 104 MMOL/L — SIGNIFICANT CHANGE UP (ref 96–108)
CO2 SERPL-SCNC: 28 MMOL/L — SIGNIFICANT CHANGE UP (ref 22–31)
CREAT SERPL-MCNC: 0.92 MG/DL — SIGNIFICANT CHANGE UP (ref 0.5–1.3)
EGFR: 103 ML/MIN/1.73M2 — SIGNIFICANT CHANGE UP
GLUCOSE BLDC GLUCOMTR-MCNC: 141 MG/DL — HIGH (ref 70–99)
GLUCOSE BLDC GLUCOMTR-MCNC: 150 MG/DL — HIGH (ref 70–99)
GLUCOSE BLDC GLUCOMTR-MCNC: 156 MG/DL — HIGH (ref 70–99)
GLUCOSE BLDC GLUCOMTR-MCNC: 168 MG/DL — HIGH (ref 70–99)
GLUCOSE SERPL-MCNC: 172 MG/DL — HIGH (ref 70–99)
HCT VFR BLD CALC: 32.7 % — LOW (ref 39–50)
HGB BLD-MCNC: 10.7 G/DL — LOW (ref 13–17)
MCHC RBC-ENTMCNC: 27.6 PG — SIGNIFICANT CHANGE UP (ref 27–34)
MCHC RBC-ENTMCNC: 32.7 GM/DL — SIGNIFICANT CHANGE UP (ref 32–36)
MCV RBC AUTO: 84.3 FL — SIGNIFICANT CHANGE UP (ref 80–100)
NRBC # BLD: 0 /100 WBCS — SIGNIFICANT CHANGE UP (ref 0–0)
PLATELET # BLD AUTO: 214 K/UL — SIGNIFICANT CHANGE UP (ref 150–400)
POTASSIUM SERPL-MCNC: 3.6 MMOL/L — SIGNIFICANT CHANGE UP (ref 3.5–5.3)
POTASSIUM SERPL-SCNC: 3.6 MMOL/L — SIGNIFICANT CHANGE UP (ref 3.5–5.3)
RBC # BLD: 3.88 M/UL — LOW (ref 4.2–5.8)
RBC # FLD: 13.6 % — SIGNIFICANT CHANGE UP (ref 10.3–14.5)
SODIUM SERPL-SCNC: 141 MMOL/L — SIGNIFICANT CHANGE UP (ref 135–145)
WBC # BLD: 7.53 K/UL — SIGNIFICANT CHANGE UP (ref 3.8–10.5)
WBC # FLD AUTO: 7.53 K/UL — SIGNIFICANT CHANGE UP (ref 3.8–10.5)

## 2023-09-11 RX ORDER — GABAPENTIN 400 MG/1
300 CAPSULE ORAL THREE TIMES A DAY
Refills: 0 | Status: DISCONTINUED | OUTPATIENT
Start: 2023-09-11 | End: 2023-09-15

## 2023-09-11 RX ORDER — SODIUM CHLORIDE 9 MG/ML
1000 INJECTION, SOLUTION INTRAVENOUS
Refills: 0 | Status: DISCONTINUED | OUTPATIENT
Start: 2023-09-11 | End: 2023-09-11

## 2023-09-11 RX ORDER — HYDROMORPHONE HYDROCHLORIDE 2 MG/ML
0.5 INJECTION INTRAMUSCULAR; INTRAVENOUS; SUBCUTANEOUS
Refills: 0 | Status: DISCONTINUED | OUTPATIENT
Start: 2023-09-11 | End: 2023-09-15

## 2023-09-11 RX ORDER — HYDROMORPHONE HYDROCHLORIDE 2 MG/ML
4 INJECTION INTRAMUSCULAR; INTRAVENOUS; SUBCUTANEOUS EVERY 4 HOURS
Refills: 0 | Status: DISCONTINUED | OUTPATIENT
Start: 2023-09-11 | End: 2023-09-15

## 2023-09-11 RX ORDER — HYDROMORPHONE HYDROCHLORIDE 2 MG/ML
2 INJECTION INTRAMUSCULAR; INTRAVENOUS; SUBCUTANEOUS EVERY 4 HOURS
Refills: 0 | Status: DISCONTINUED | OUTPATIENT
Start: 2023-09-11 | End: 2023-09-15

## 2023-09-11 RX ADMIN — ATORVASTATIN CALCIUM 40 MILLIGRAM(S): 80 TABLET, FILM COATED ORAL at 22:01

## 2023-09-11 RX ADMIN — HYDROMORPHONE HYDROCHLORIDE 0.5 MILLIGRAM(S): 2 INJECTION INTRAMUSCULAR; INTRAVENOUS; SUBCUTANEOUS at 17:30

## 2023-09-11 RX ADMIN — SODIUM CHLORIDE 75 MILLILITER(S): 9 INJECTION, SOLUTION INTRAVENOUS at 05:57

## 2023-09-11 RX ADMIN — HYDROMORPHONE HYDROCHLORIDE 4 MILLIGRAM(S): 2 INJECTION INTRAMUSCULAR; INTRAVENOUS; SUBCUTANEOUS at 10:33

## 2023-09-11 RX ADMIN — Medication 1000 MILLIGRAM(S): at 06:25

## 2023-09-11 RX ADMIN — AMLODIPINE BESYLATE 5 MILLIGRAM(S): 2.5 TABLET ORAL at 05:27

## 2023-09-11 RX ADMIN — PANTOPRAZOLE SODIUM 40 MILLIGRAM(S): 20 TABLET, DELAYED RELEASE ORAL at 05:27

## 2023-09-11 RX ADMIN — HYDROMORPHONE HYDROCHLORIDE 4 MILLIGRAM(S): 2 INJECTION INTRAMUSCULAR; INTRAVENOUS; SUBCUTANEOUS at 14:14

## 2023-09-11 RX ADMIN — GABAPENTIN 300 MILLIGRAM(S): 400 CAPSULE ORAL at 14:14

## 2023-09-11 RX ADMIN — HYDROMORPHONE HYDROCHLORIDE 4 MILLIGRAM(S): 2 INJECTION INTRAMUSCULAR; INTRAVENOUS; SUBCUTANEOUS at 09:33

## 2023-09-11 RX ADMIN — HYDROMORPHONE HYDROCHLORIDE 2 MILLIGRAM(S): 2 INJECTION INTRAMUSCULAR; INTRAVENOUS; SUBCUTANEOUS at 05:27

## 2023-09-11 RX ADMIN — Medication 1000 MILLIGRAM(S): at 15:10

## 2023-09-11 RX ADMIN — PRASUGREL 10 MILLIGRAM(S): 5 TABLET, FILM COATED ORAL at 12:52

## 2023-09-11 RX ADMIN — CELECOXIB 200 MILLIGRAM(S): 200 CAPSULE ORAL at 05:27

## 2023-09-11 RX ADMIN — CELECOXIB 200 MILLIGRAM(S): 200 CAPSULE ORAL at 06:25

## 2023-09-11 RX ADMIN — GABAPENTIN 300 MILLIGRAM(S): 400 CAPSULE ORAL at 22:01

## 2023-09-11 RX ADMIN — HYDROMORPHONE HYDROCHLORIDE 4 MILLIGRAM(S): 2 INJECTION INTRAMUSCULAR; INTRAVENOUS; SUBCUTANEOUS at 21:40

## 2023-09-11 RX ADMIN — HYDROMORPHONE HYDROCHLORIDE 4 MILLIGRAM(S): 2 INJECTION INTRAMUSCULAR; INTRAVENOUS; SUBCUTANEOUS at 15:14

## 2023-09-11 RX ADMIN — HYDROMORPHONE HYDROCHLORIDE 0.5 MILLIGRAM(S): 2 INJECTION INTRAMUSCULAR; INTRAVENOUS; SUBCUTANEOUS at 17:45

## 2023-09-11 RX ADMIN — HYDROMORPHONE HYDROCHLORIDE 0.5 MILLIGRAM(S): 2 INJECTION INTRAMUSCULAR; INTRAVENOUS; SUBCUTANEOUS at 22:33

## 2023-09-11 RX ADMIN — Medication 1000 MILLIGRAM(S): at 05:25

## 2023-09-11 RX ADMIN — HYDROMORPHONE HYDROCHLORIDE 2 MILLIGRAM(S): 2 INJECTION INTRAMUSCULAR; INTRAVENOUS; SUBCUTANEOUS at 06:25

## 2023-09-11 RX ADMIN — Medication 2: at 17:30

## 2023-09-11 RX ADMIN — Medication 1000 MILLIGRAM(S): at 22:01

## 2023-09-11 RX ADMIN — HYDROMORPHONE HYDROCHLORIDE 0.5 MILLIGRAM(S): 2 INJECTION INTRAMUSCULAR; INTRAVENOUS; SUBCUTANEOUS at 12:45

## 2023-09-11 RX ADMIN — Medication 2: at 22:16

## 2023-09-11 RX ADMIN — Medication 1000 MILLIGRAM(S): at 14:10

## 2023-09-11 RX ADMIN — HYDROMORPHONE HYDROCHLORIDE 0.5 MILLIGRAM(S): 2 INJECTION INTRAMUSCULAR; INTRAVENOUS; SUBCUTANEOUS at 13:00

## 2023-09-11 RX ADMIN — CELECOXIB 200 MILLIGRAM(S): 200 CAPSULE ORAL at 17:29

## 2023-09-11 RX ADMIN — HYDROMORPHONE HYDROCHLORIDE 0.5 MILLIGRAM(S): 2 INJECTION INTRAMUSCULAR; INTRAVENOUS; SUBCUTANEOUS at 22:15

## 2023-09-11 RX ADMIN — HYDROMORPHONE HYDROCHLORIDE 4 MILLIGRAM(S): 2 INJECTION INTRAMUSCULAR; INTRAVENOUS; SUBCUTANEOUS at 20:45

## 2023-09-11 RX ADMIN — Medication 81 MILLIGRAM(S): at 12:45

## 2023-09-11 RX ADMIN — CELECOXIB 200 MILLIGRAM(S): 200 CAPSULE ORAL at 18:29

## 2023-09-11 NOTE — PROGRESS NOTE ADULT - SUBJECTIVE AND OBJECTIVE BOX
Ortho Note    Subjective:  Pt seen and examined today  Patient reports pain poorly controlled over the weekend  patient reports he has not eaten much because he is afraid of having a bm in the bed.   Denies CP, SOB, N/V, numbness/tingling   Reviewed plan of care with patient at bedside- encouraged patient to order breakfast, discussed plan to adjust pain regimen  commode provided at bedside    Patient reports following dr. Khai Norman for pain management. Istop confirmed patient sees Dr. Chisholm, Miles Cadena MD and Deja Damon. He was prescribed percocet 56 tablets 14 day supply. He states he takes 2 to 3 tablets a day prn for lower back pain and radiculopathy.     Vital Signs Last 24 Hrs  T(C): 36.4 (09-11-23 @ 09:07), Max: 36.4 (09-11-23 @ 09:07)  T(F): 97.6 (09-11-23 @ 09:07), Max: 97.6 (09-11-23 @ 09:07)  HR: 100 (09-11-23 @ 09:07) (100 - 100)  BP: 133/70 (09-11-23 @ 09:07) (133/70 - 133/70)  BP(mean): --  RR: 17 (09-11-23 @ 09:07) (17 - 17)  SpO2: 94% (09-11-23 @ 09:07) (94% - 94%)  AVSS    Objective:    Physical Exam:  General: Pt Alert and oriented, NAD  Right knee aquacel DSG C/D/I- with kryocuff  Pulses: +2 pedal pulses, wwp toes  Sensation: silt intact bilateral lower extremities  Motor: EHL/FHL/TA/GS- firing   limited due to pain         Plan of Care:  A/P: 48yMale POD#3 s/p R TKR  - afebrile  - Pain Control- Dilaudid 2-4mg PO Q4h prn moderate to severe pain, gabapentin 300mg PO TID, tylenol 1000mg PO Q8h, celebrex 200mg PO BID   - DVT ppx: aspirin 81mg PO daily, prasurgrel 10mg PO Daily   - PT, WBS: WBAT  - bowel regimen, IS use, PPI   - appreciate medicine regimen  - consult endocrine patient takes lantus 80mg nightly and metoprolol 1000mg PO BID   - Dispo- pending pt carin , medical clearance      Ortho Pager [phone]

## 2023-09-11 NOTE — PROGRESS NOTE ADULT - SUBJECTIVE AND OBJECTIVE BOX
Ortho Note    Pt comfortable without complaints.  Denies CP, SOB, N/V, numbness/tingling. Cleared PT, reports pain is a little better this am    Vital Signs Last 24 Hrs  T(C): 37.8 (09-11-23 @ 05:17), Max: 37.8 (09-11-23 @ 05:17)  T(F): 100 (09-11-23 @ 05:17), Max: 100 (09-11-23 @ 05:17)  HR: 108 (09-11-23 @ 05:17) (108 - 108)  BP: 131/72 (09-11-23 @ 05:17) (131/72 - 131/72)  BP(mean): --  RR: 16 (09-11-23 @ 05:17) (16 - 16)  SpO2: 97% (09-11-23 @ 05:17) (97% - 97%)  I&O's Summary    10 Sep 2023 07:01  -  11 Sep 2023 07:00  --------------------------------------------------------  IN: 0 mL / OUT: 1000 mL / NET: -1000 mL          General: Pt Alert and oriented, NAD  DSG C/D/I - Aquacel to right knee, ice packs in place  Pulses: 2+ DP pulses palpable bilaterally, skin wwp, cap refill brisk.   Sensation: Decreased sensation to left lower extremity which is consistent with pre-op exam. Otherwise SILT.   Motor: EHL/FHL/TA/GS 5/5 bilaterally                          11.5   10.55 )-----------( 175      ( 10 Sep 2023 06:13 )             36.6     09-10    138  |  102  |  11  ----------------------------<  168<H>  4.3   |  27  |  0.91    Ca    8.6      10 Sep 2023 06:13      A/P: 49yo Male POD#3s/p Right TKR  - Stable  - Per anesthesia, admit to tele for likely undiagnosed ALEXA. Patient sating 95% on 4L NC  - Pain Control  - DVT ppx: ASA and Prasugrel POD#1  - Post op abx: Ancef  - PT, WBS: WBAT  - Dispo: Pending PT eval    Ortho Pager [phone]    Ortho Pager [phone]

## 2023-09-12 LAB
ANION GAP SERPL CALC-SCNC: 11 MMOL/L — SIGNIFICANT CHANGE UP (ref 5–17)
BUN SERPL-MCNC: 18 MG/DL — SIGNIFICANT CHANGE UP (ref 7–23)
CALCIUM SERPL-MCNC: 8.8 MG/DL — SIGNIFICANT CHANGE UP (ref 8.4–10.5)
CHLORIDE SERPL-SCNC: 104 MMOL/L — SIGNIFICANT CHANGE UP (ref 96–108)
CO2 SERPL-SCNC: 26 MMOL/L — SIGNIFICANT CHANGE UP (ref 22–31)
CREAT SERPL-MCNC: 0.98 MG/DL — SIGNIFICANT CHANGE UP (ref 0.5–1.3)
EGFR: 96 ML/MIN/1.73M2 — SIGNIFICANT CHANGE UP
GLUCOSE BLDC GLUCOMTR-MCNC: 148 MG/DL — HIGH (ref 70–99)
GLUCOSE BLDC GLUCOMTR-MCNC: 162 MG/DL — HIGH (ref 70–99)
GLUCOSE BLDC GLUCOMTR-MCNC: 169 MG/DL — HIGH (ref 70–99)
GLUCOSE BLDC GLUCOMTR-MCNC: 176 MG/DL — HIGH (ref 70–99)
GLUCOSE SERPL-MCNC: 167 MG/DL — HIGH (ref 70–99)
HCT VFR BLD CALC: 32.5 % — LOW (ref 39–50)
HGB BLD-MCNC: 10.2 G/DL — LOW (ref 13–17)
MCHC RBC-ENTMCNC: 27.3 PG — SIGNIFICANT CHANGE UP (ref 27–34)
MCHC RBC-ENTMCNC: 31.4 GM/DL — LOW (ref 32–36)
MCV RBC AUTO: 87.1 FL — SIGNIFICANT CHANGE UP (ref 80–100)
NRBC # BLD: 0 /100 WBCS — SIGNIFICANT CHANGE UP (ref 0–0)
PLATELET # BLD AUTO: 254 K/UL — SIGNIFICANT CHANGE UP (ref 150–400)
POTASSIUM SERPL-MCNC: 3.8 MMOL/L — SIGNIFICANT CHANGE UP (ref 3.5–5.3)
POTASSIUM SERPL-SCNC: 3.8 MMOL/L — SIGNIFICANT CHANGE UP (ref 3.5–5.3)
RBC # BLD: 3.73 M/UL — LOW (ref 4.2–5.8)
RBC # FLD: 13.8 % — SIGNIFICANT CHANGE UP (ref 10.3–14.5)
SODIUM SERPL-SCNC: 141 MMOL/L — SIGNIFICANT CHANGE UP (ref 135–145)
WBC # BLD: 8.58 K/UL — SIGNIFICANT CHANGE UP (ref 3.8–10.5)
WBC # FLD AUTO: 8.58 K/UL — SIGNIFICANT CHANGE UP (ref 3.8–10.5)

## 2023-09-12 RX ADMIN — CELECOXIB 200 MILLIGRAM(S): 200 CAPSULE ORAL at 06:00

## 2023-09-12 RX ADMIN — Medication 1000 MILLIGRAM(S): at 21:46

## 2023-09-12 RX ADMIN — PRASUGREL 10 MILLIGRAM(S): 5 TABLET, FILM COATED ORAL at 11:54

## 2023-09-12 RX ADMIN — HYDROMORPHONE HYDROCHLORIDE 0.5 MILLIGRAM(S): 2 INJECTION INTRAMUSCULAR; INTRAVENOUS; SUBCUTANEOUS at 23:05

## 2023-09-12 RX ADMIN — AMLODIPINE BESYLATE 5 MILLIGRAM(S): 2.5 TABLET ORAL at 06:00

## 2023-09-12 RX ADMIN — HYDROMORPHONE HYDROCHLORIDE 4 MILLIGRAM(S): 2 INJECTION INTRAMUSCULAR; INTRAVENOUS; SUBCUTANEOUS at 02:36

## 2023-09-12 RX ADMIN — ATORVASTATIN CALCIUM 40 MILLIGRAM(S): 80 TABLET, FILM COATED ORAL at 21:46

## 2023-09-12 RX ADMIN — GABAPENTIN 300 MILLIGRAM(S): 400 CAPSULE ORAL at 21:46

## 2023-09-12 RX ADMIN — CELECOXIB 200 MILLIGRAM(S): 200 CAPSULE ORAL at 17:35

## 2023-09-12 RX ADMIN — PANTOPRAZOLE SODIUM 40 MILLIGRAM(S): 20 TABLET, DELAYED RELEASE ORAL at 06:00

## 2023-09-12 RX ADMIN — HYDROMORPHONE HYDROCHLORIDE 0.5 MILLIGRAM(S): 2 INJECTION INTRAMUSCULAR; INTRAVENOUS; SUBCUTANEOUS at 17:02

## 2023-09-12 RX ADMIN — HYDROMORPHONE HYDROCHLORIDE 0.5 MILLIGRAM(S): 2 INJECTION INTRAMUSCULAR; INTRAVENOUS; SUBCUTANEOUS at 23:30

## 2023-09-12 RX ADMIN — Medication 81 MILLIGRAM(S): at 11:48

## 2023-09-12 RX ADMIN — GABAPENTIN 300 MILLIGRAM(S): 400 CAPSULE ORAL at 06:00

## 2023-09-12 RX ADMIN — HYDROMORPHONE HYDROCHLORIDE 0.5 MILLIGRAM(S): 2 INJECTION INTRAMUSCULAR; INTRAVENOUS; SUBCUTANEOUS at 03:43

## 2023-09-12 RX ADMIN — HYDROMORPHONE HYDROCHLORIDE 4 MILLIGRAM(S): 2 INJECTION INTRAMUSCULAR; INTRAVENOUS; SUBCUTANEOUS at 16:47

## 2023-09-12 RX ADMIN — HYDROMORPHONE HYDROCHLORIDE 4 MILLIGRAM(S): 2 INJECTION INTRAMUSCULAR; INTRAVENOUS; SUBCUTANEOUS at 03:33

## 2023-09-12 RX ADMIN — HYDROMORPHONE HYDROCHLORIDE 0.5 MILLIGRAM(S): 2 INJECTION INTRAMUSCULAR; INTRAVENOUS; SUBCUTANEOUS at 12:03

## 2023-09-12 RX ADMIN — HYDROMORPHONE HYDROCHLORIDE 4 MILLIGRAM(S): 2 INJECTION INTRAMUSCULAR; INTRAVENOUS; SUBCUTANEOUS at 22:40

## 2023-09-12 RX ADMIN — HYDROMORPHONE HYDROCHLORIDE 4 MILLIGRAM(S): 2 INJECTION INTRAMUSCULAR; INTRAVENOUS; SUBCUTANEOUS at 07:25

## 2023-09-12 RX ADMIN — Medication 1000 MILLIGRAM(S): at 06:00

## 2023-09-12 RX ADMIN — HYDROMORPHONE HYDROCHLORIDE 4 MILLIGRAM(S): 2 INJECTION INTRAMUSCULAR; INTRAVENOUS; SUBCUTANEOUS at 21:46

## 2023-09-12 RX ADMIN — HYDROMORPHONE HYDROCHLORIDE 0.5 MILLIGRAM(S): 2 INJECTION INTRAMUSCULAR; INTRAVENOUS; SUBCUTANEOUS at 17:17

## 2023-09-12 RX ADMIN — HYDROMORPHONE HYDROCHLORIDE 4 MILLIGRAM(S): 2 INJECTION INTRAMUSCULAR; INTRAVENOUS; SUBCUTANEOUS at 08:25

## 2023-09-12 RX ADMIN — Medication 2: at 07:22

## 2023-09-12 RX ADMIN — HYDROMORPHONE HYDROCHLORIDE 4 MILLIGRAM(S): 2 INJECTION INTRAMUSCULAR; INTRAVENOUS; SUBCUTANEOUS at 15:47

## 2023-09-12 RX ADMIN — Medication 2: at 17:18

## 2023-09-12 RX ADMIN — GABAPENTIN 300 MILLIGRAM(S): 400 CAPSULE ORAL at 14:28

## 2023-09-12 RX ADMIN — HYDROMORPHONE HYDROCHLORIDE 0.5 MILLIGRAM(S): 2 INJECTION INTRAMUSCULAR; INTRAVENOUS; SUBCUTANEOUS at 11:48

## 2023-09-12 RX ADMIN — Medication 2: at 22:50

## 2023-09-12 RX ADMIN — HYDROMORPHONE HYDROCHLORIDE 0.5 MILLIGRAM(S): 2 INJECTION INTRAMUSCULAR; INTRAVENOUS; SUBCUTANEOUS at 04:00

## 2023-09-12 RX ADMIN — Medication 1000 MILLIGRAM(S): at 14:28

## 2023-09-12 NOTE — PROGRESS NOTE ADULT - SUBJECTIVE AND OBJECTIVE BOX
Ortho Note    Pt comfortable without complaints, pain controlled  Denies CP, SOB, N/V, numbness/tingling     Vital Signs Last 24 Hrs  T(C): 36.7 (09-12-23 @ 05:58), Max: 36.7 (09-12-23 @ 03:41)  T(F): 98.1 (09-12-23 @ 05:58), Max: 98.1 (09-12-23 @ 05:58)  HR: 92 (09-12-23 @ 05:58) (92 - 104)  BP: 136/69 (09-12-23 @ 05:58) (135/66 - 137/65)  BP(mean): --  RR: 19 (09-12-23 @ 05:58) (18 - 19)  SpO2: 96% (09-12-23 @ 05:58) (96% - 98%)  I&O's Summary    10 Sep 2023 07:01  -  11 Sep 2023 07:00  --------------------------------------------------------  IN: 0 mL / OUT: 1000 mL / NET: -1000 mL    11 Sep 2023 07:01  -  12 Sep 2023 06:46  --------------------------------------------------------  IN: 1100 mL / OUT: 900 mL / NET: 200 mL        General: Pt Alert and oriented, NAD  DSG C/D/I - Aquacel to right knee, ice packs in place  Pulses: 2+ DP pulses palpable bilaterally, skin wwp, cap refill brisk.   Sensation: Decreased sensation to left lower extremity which is consistent with pre-op exam. Otherwise SILT.   Motor: EHL/FHL/TA/GS 5/5 bilaterally                        10.7   7.53  )-----------( 214      ( 11 Sep 2023 12:27 )             32.7     09-11    141  |  104  |  15  ----------------------------<  172<H>  3.6   |  28  |  0.92    Ca    8.6      11 Sep 2023 12:26        A/P: 49yo Male POD#4 s/p Right TKR    -Pain control  - Stable  - Per anesthesia, admit to tele for likely undiagnosed ALEXA. Patient sating 95% on 4L NC  - Pain Control  - DVT ppx: ASA and Prasugrel POD#1  - Post op abx: Ancef  - PT, WBS: WBAT  - Cleared PT - home PT rolling walker (9/11)    Ortho Pager [phone]

## 2023-09-12 NOTE — CONSULT NOTE ADULT - ASSESSMENT
48yoM with right knee pain x 6 years. 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 He had a scope of his knee within the 2 years after his injury and reports it did not help. He says that he came to Dr. Butcher as a second opinion who recommended a knee replacement. He ambulates without the use of a cane or walker. Pain persists despite conservative management. He takes extra strength Tylenol for  pain and oxycodone 5mg every so often for severe pain. He has a pain management doctor that he last say 1 day ago who manages his back pain from history of spinal fusions, hip pain and knee pain. Denies numbness or tingling of the right leg. He reports numbness in the left leg since his spinal fusion.     History of stents placed may 2020 on prasugrel 10mg last dose was taken 5 days ago per cards and pcp. Denies history of seizures or blood clots. Denies chest pain, sob or nausea this morning.     A1C: 7.5 %  BUN: 18  Creatinine: 0.98  GFR: 96  Weight: 100.3  BMI: 37.9  EF:   48yoM with PMH of type 2 DM, CAD with stents in 2020, HTN, HLD, and depression presents with right knee pain x 6 years, now status post right TKR for osteoarthritis on 9/8.    A1C is close to target of 7%. Minimal hyperglycemia in hospital, though his appetite is not at baseline. He is on a large dose of levemir (though denies hypoglycemia, so likely a diet blanket) and metformin, which both help more to control the glucose overnight and inbetween meals. He likely needs to add a secretagogue to help with mealtime glucose, decrease dose of levemir, and continue metformin. We discussed GLP-1 therapy (for glycemic control + appetite suppression/weight loss), and he might be amenable but will discuss further when he is more awake.    A1C: 7.5 %  BUN: 18  Creatinine: 0.98  GFR: 96  Weight: 100.3  BMI: 37.9     48yoM with PMH of type 2 DM, CAD with stents in 2020, HTN, HLD, and depression presents with right knee pain x 6 years, now status post right TKR for osteoarthritis on 9/8.    A1C is close to target of 7%. Minimal hyperglycemia in hospital, though his appetite is not at baseline. He is on a large dose of levemir (though denies hypoglycemia, so likely a diet blanket) and metformin, which both help more to control the glucose overnight and in-between meals. He likely needs to add a secretagogue to help with mealtime glucose, decrease dose of levemir, and continue metformin. We discussed GLP-1 therapy (for glycemic control + appetite suppression/weight loss), and he might be amenable but wants to discuss with his endocrinologist.    A1C: 7.5 %  BUN: 18  Creatinine: 0.98  GFR: 96  Weight: 100.3  BMI: 37.9

## 2023-09-12 NOTE — CONSULT NOTE ADULT - NS ATTEND AMEND GEN_ALL_CORE FT
The patient is referred for consultation re his Diabetes control. He had a knee procedure September 8. Hus Hgb A1C is 7.5%.At home his glucose is 120-140 fasting and highest 180. He does snack. He has been treated at home with 80 units Levemir Insulin. He is 100 kg weight with BMI 38. He has not received steroids. I agree with moderate sliding scale. He will need a better Insulin regimen when he goes home,perhaps split dose Lantus Insulin if he truly needs > 50 units.GLP-1 should be considered.

## 2023-09-12 NOTE — CONSULT NOTE ADULT - SUBJECTIVE AND OBJECTIVE BOX
HISTORY OF PRESENT ILLNESS  48yoM with right knee pain x 6 years. 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 He had a scope of his knee within the 2 years after his injury and reports it did not help. He says that he came to Dr. Butcher as a second opinion who recommended a knee replacement. He ambulates without the use of a cane or walker. Pain persists despite conservative management. He takes extra strength Tylenol for  pain and oxycodone 5mg every so often for severe pain. He has a pain management doctor that he last say 1 day ago who manages his back pain from history of spinal fusions, hip pain and knee pain. Denies numbness or tingling of the right leg. He reports numbness in the left leg since his spinal fusion.     History of stents placed may 2020 on prasugrel 10mg last dose was taken 5 days ago per cards and pcp. Denies history of seizures or blood clots. Denies chest pain, sob or nausea this morning.     Presents today for right TKA.  ·  POST-OP DIAGNOSIS:  Osteoarthritis of right knee 08-Sep-2023 12:44:00  Patricia Jin.  ·  PROCEDURES:  Right total knee replacement 08-Sep-2023 12:43:44  Patricia Jin.    A1C 7.8 preop    CAPILLARY BLOOD GLUCOSE & INSULIN RECEIVED  150 mg/dL (09-11 @ 12:07)  172 mg/dL (09-11 @ 12:26)  156 mg/dL (09-11 @ 17:22)  168 mg/dL (09-11 @ 22:13)  167 mg/dL (09-12 @ 05:30)  176 mg/dL (09-12 @ 06:35)      DIABETES HISTORY  - Age at diagnosis:   - Symptoms at time of diagnosis:   - Current Therapy:  - History of other regimens:   - History of hypoglycemia:   - History of DKA/HHS:   - Complications:   - Home FSG:        > Fasting: *** mg/dL.        > Before meals: *** mg/dL.        > Bedtime: *** mg/dL.  - Diet:          > Breakfast:         > Lunch:        > Dinner:        > Snacks:  - Physical activity:    - Outpatient follow-up:     PAST MEDICAL & SURGICAL HISTORY  As per history of present illness.     FAMILY HISTORY  - Diabetes:  - Thyroid:  - Autoimmune:  - Other:    SOCIAL HISTORY  denies smoking history, recreational drug use and etoh use. reports he lives in a house with a smoker and expose to second hand smoke.    ALLERGIES  No Known Allergies    CURRENT MEDICATIONS  acetaminophen     Tablet .. 1000 milliGRAM(s) Oral every 8 hours  amLODIPine   Tablet 5 milliGRAM(s) Oral daily  aspirin enteric coated 81 milliGRAM(s) Oral daily  atorvastatin 40 milliGRAM(s) Oral at bedtime  celecoxib 200 milliGRAM(s) Oral every 12 hours  dextrose 5%. 1000 milliLiter(s) IV Continuous <Continuous>  dextrose 5%. 1000 milliLiter(s) IV Continuous <Continuous>  dextrose 50% Injectable 25 Gram(s) IV Push once  dextrose 50% Injectable 12.5 Gram(s) IV Push once  dextrose 50% Injectable 25 Gram(s) IV Push once  dextrose Oral Gel 15 Gram(s) Oral once PRN  gabapentin 300 milliGRAM(s) Oral three times a day  glucagon  Injectable 1 milliGRAM(s) IntraMuscular once  HYDROmorphone   Tablet 4 milliGRAM(s) Oral every 4 hours PRN  HYDROmorphone   Tablet 2 milliGRAM(s) Oral every 4 hours PRN  HYDROmorphone  Injectable 0.5 milliGRAM(s) IV Push every 2 hours PRN  influenza   Vaccine 0.5 milliLiter(s) IntraMuscular once  insulin lispro (ADMELOG) corrective regimen sliding scale   SubCutaneous Before meals and at bedtime  magnesium hydroxide Suspension 30 milliLiter(s) Oral daily PRN  ondansetron Injectable 4 milliGRAM(s) IV Push every 6 hours PRN  pantoprazole    Tablet 40 milliGRAM(s) Oral before breakfast  polyethylene glycol 3350 17 Gram(s) Oral at bedtime  prasugrel 10 milliGRAM(s) Oral daily  senna 2 Tablet(s) Oral at bedtime    REVIEW OF SYSTEMS  Constitutional:  Negative fever, chills or loss of appetite.  Eyes:  Negative blurry vision or double vision.  Cardiovascular:  Negative for chest pain or palpitations.  Respiratory:  Negative for cough, wheezing, or shortness of breath.   Gastrointestinal:  Negative for nausea, vomiting, diarrhea, constipation, or abdominal pain.  Genitourinary:  Negative frequency, urgency or dysuria.  Neurologic:  No headache, confusion, dizziness, lightheadedness.    PHYSICAL EXAM  Vital Signs Last 24 Hrs  T(C): 37.2 (12 Sep 2023 09:22), Max: 37.3 (11 Sep 2023 17:16)  T(F): 99 (12 Sep 2023 09:22), Max: 99.2 (11 Sep 2023 17:16)  HR: 90 (12 Sep 2023 09:22) (90 - 106)  BP: 138/69 (12 Sep 2023 09:22) (124/70 - 154/85)  BP(mean): --  RR: 14 (12 Sep 2023 09:22) (14 - 20)  SpO2: 93% (12 Sep 2023 09:22) (93% - 98%)    Parameters below as of 12 Sep 2023 09:22  Patient On (Oxygen Delivery Method): nasal cannula  O2 Flow (L/min): 2  Constitutional: Awake, alert, in no acute distress.   HEENT: Normocephalic, atraumatic, JAMEE, no proptosis or lid retraction.   Neck: supple, no acanthosis, no thyromegaly or palpable thyroid nodules.  Respiratory: Lungs clear to ausculation bilaterally.   Cardiovascular: regular rhythm, normal S1 and S2, no audible murmurs.   GI: soft, non-tender, non-distended, bowel sounds present, no masses appreciated.  Extremities: No lower extremity edema, peripheral pulses present.   Skin: no rashes.   Psychiatric: AAO x 3. Normal affect/mood.     LABS  CBC - WBC/HGB/HTC/PLT: 8.58/10.2/32.5/254 (09-12-23)  BMP: Na/K/Cl/Bicarb/BUN/Cr/Gluc: 141/3.8/104/26/18/0.98/167 (09-12-23)  Anion Gap: 11 (09-12-23)  eGFR: 96 (09-12-23)  Calcium: 8.8 (09-12-23)  Phosphorus: -- (09-12-23)  Magnesium: -- (09-12-23)   HISTORY OF PRESENT ILLNESS  48yoM with PMH of type 2 dm, CAD with stents in , HTN, HLD, and depression presents with right knee pain x 6 years. He says that he was injured on the job in 2017. He had a scope of his knee within the 2 years after his injury and reports it did not help. He says that he came to Dr. Butcher as a second opinion who recommended a knee replacement. He ambulates without the use of a cane or walker. Pain persists despite conservative management. He takes extra strength Tylenol for  pain and oxycodone 5mg occasionally often for severe pain. He has a pain management doctor that he last say 1 day ago who manages his back pain from history of spinal fusions, hip pain and knee pain. Denies numbness or tingling of the right leg. He reports numbness in the left leg since his spinal fusion.   He is now status post right TKR for osteoarthritis on . He reports continued pain in knee, which is affecting his appetite. He denies nausea. He is conversant during interview but drowsy.    Endocrine consulted for type 2 diabetes management and hyperglycemia. He has had 2 blood glucose values > 180 since . He is on lispro moderate sliding scale before meals and at bedtime. He was on a regular diet until today. No steroids administered during admission. Per chart: Preop A1C 7.8; In hospital 7.5%. BG was 126 mg/dL on admission.    CAPILLARY BLOOD GLUCOSE   150 mg/dL ( @ 12:07)  172 mg/dL ( @ 12:26)   156 mg/dL ( @ 17:22) - Lispro 2  168 mg/dL ( @ 22:13) - Lispro 2  167 mg/dL ( @ 05:30)  176 mg/dL ( @ 06:35) - Lispro 2    DIABETES HISTORY  - Age at diagnosis: 39y/o  - Current Therapy: Levemir 80 units at night and metformin 1000mg twice daily.  - History of other regimens: None  - History of hypoglycemia: None  - History of DKA/HHS: Denies  - Complications: Eye exam UTD with no retinopathy. 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  - Home FSG: checks multiple times/day        > Fastin-140 mg/dL.        > 2-3 hours postprandially 100-120 mg/dL.        > Bedtime: <140 mg/dL.  - Diet: Variable, but due to depression he does a lot of snacking after dinner ("chips and junk")  - Physical activity: Limited due to injuries  - Outpatient follow-up: endocrinology in [facility] reportedly seen every 3 months.    SOCIAL HISTORY  denies smoking history, recreational drug use and etoh use. reports he lives in a house with a smoker and expose to second hand smoke.    ALLERGIES  No Known Allergies    CURRENT MEDICATIONS  acetaminophen     Tablet .. 1000 milliGRAM(s) Oral every 8 hours  amLODIPine   Tablet 5 milliGRAM(s) Oral daily  aspirin enteric coated 81 milliGRAM(s) Oral daily  atorvastatin 40 milliGRAM(s) Oral at bedtime  celecoxib 200 milliGRAM(s) Oral every 12 hours  dextrose 5%. 1000 milliLiter(s) IV Continuous <Continuous>  dextrose 5%. 1000 milliLiter(s) IV Continuous <Continuous>  dextrose 50% Injectable 25 Gram(s) IV Push once  dextrose 50% Injectable 12.5 Gram(s) IV Push once  dextrose 50% Injectable 25 Gram(s) IV Push once  dextrose Oral Gel 15 Gram(s) Oral once PRN  gabapentin 300 milliGRAM(s) Oral three times a day  glucagon  Injectable 1 milliGRAM(s) IntraMuscular once  HYDROmorphone   Tablet 4 milliGRAM(s) Oral every 4 hours PRN  HYDROmorphone   Tablet 2 milliGRAM(s) Oral every 4 hours PRN  HYDROmorphone  Injectable 0.5 milliGRAM(s) IV Push every 2 hours PRN  influenza   Vaccine 0.5 milliLiter(s) IntraMuscular once  insulin lispro (ADMELOG) corrective regimen sliding scale   SubCutaneous Before meals and at bedtime  magnesium hydroxide Suspension 30 milliLiter(s) Oral daily PRN  ondansetron Injectable 4 milliGRAM(s) IV Push every 6 hours PRN  pantoprazole    Tablet 40 milliGRAM(s) Oral before breakfast  polyethylene glycol 3350 17 Gram(s) Oral at bedtime  prasugrel 10 milliGRAM(s) Oral daily  senna 2 Tablet(s) Oral at bedtime    REVIEW OF SYSTEMS  Constitutional:  Negative fever, chills (+) loss of appetite.  Eyes:  Negative blurry vision or double vision.  Cardiovascular:  Negative for chest pain or palpitations.  Respiratory:  Negative for cough, wheezing, or shortness of breath.   Gastrointestinal:  Negative for nausea, vomiting, diarrhea, constipation, or abdominal pain.  Genitourinary:  Negative frequency, urgency or dysuria.  Neurologic:  No headache, confusion, dizziness, lightheadedness.    PHYSICAL EXAM  Vital Signs Last 24 Hrs  T(C): 37.2 (12 Sep 2023 09:22), Max: 37.3 (11 Sep 2023 17:16)  T(F): 99 (12 Sep 2023 09:22), Max: 99.2 (11 Sep 2023 17:16)  HR: 90 (12 Sep 2023 09:22) (90 - 106)  BP: 138/69 (12 Sep 2023 09:) (124/70 - 154/85)  BP(mean): --  RR: 14 (12 Sep 2023 09:) (14 - 20)  SpO2: 93% (12 Sep 2023 09:22) (93% - 98%)    Parameters below as of 12 Sep 2023 09:22  Patient On (Oxygen Delivery Method): nasal cannula  O2 Flow (L/min): 2    Constitutional: Awake, alert, in no acute distress.   HEENT: Normocephalic, atraumatic, JAMEE, no proptosis or lid retraction.   Neck: supple, no acanthosis, no thyromegaly or palpable thyroid nodules.  Respiratory: Lungs clear to ausculation bilaterally.   Cardiovascular: regular rhythm, normal S1 and S2, no audible murmurs.   GI: soft, non-tender, non-distended, bowel sounds present, no masses appreciated.  Extremities: No lower extremity edema, peripheral pulses present.   Skin: no rashes.   Psychiatric: AAO x 3. Normal affect/mood.     LABS  CBC - WBC/HGB/HTC/PLT: 8.58/10.2/32.5/254 (23)  BMP: Na/K/Cl/Bicarb/BUN/Cr/Gluc: 141/3.8/104/26/18/0.98/167 (23)  Anion Gap: 11 (23)  eGFR: 96 (23)  Calcium: 8.8 (23)  Phosphorus: -- (23)  Magnesium: -- (23)

## 2023-09-12 NOTE — CONSULT NOTE ADULT - PROBLEM SELECTOR RECOMMENDATION 9
Type 2 diabetes mellitus with hyperglycemia  - Please continue lantus *** units at bedtime.   - Continue lispro *** units before each meal.  - Continue lispro moderate / low dose sliding scale before meals and at bedtime.  - Patient's fingerstick glucose goal is 100-180 mg/dL.    - For discharge, patient can ***.    - Patient can follow up at discharge with [facility] by calling [phone] to make an appointment.      Case discussed with Dr. Thakur. Primary team updated. Type 2 diabetes mellitus with hyperglycemia  - Continue lispro moderate dose sliding scale before meals and at bedtime.  - Patient's fingerstick glucose goal is 100-180 mg/dL.    - Change diet to consistent carb.  - For discharge: TBD  - Patient can follow up with his private endocrinologist.     Case discussed with Dr Olson.

## 2023-09-12 NOTE — PROGRESS NOTE ADULT - SUBJECTIVE AND OBJECTIVE BOX
Ortho Note    Pt comfortable without complaints, pain controlled  Denies CP, SOB, N/V, numbness/tingling     Vital Signs Last 24 Hrs  T(C): 37.2 (09-12-23 @ 09:22), Max: 37.2 (09-12-23 @ 09:22)  T(F): 99 (09-12-23 @ 09:22), Max: 99 (09-12-23 @ 09:22)  HR: 90 (09-12-23 @ 09:22) (90 - 94)  BP: 138/69 (09-12-23 @ 09:22) (136/69 - 141/70)  BP(mean): --  RR: 14 (09-12-23 @ 09:22) (14 - 19)  SpO2: 93% (09-12-23 @ 09:22) (93% - 98%)  I&O's Summary    11 Sep 2023 07:01  -  12 Sep 2023 07:00  --------------------------------------------------------  IN: 1100 mL / OUT: 900 mL / NET: 200 mL        General: Pt Alert and oriented, NAD  Aquacel DSG C/D/I   Pulses: 2+ bilaterally   Sensation: SILT  Motor: Quad/Ham/EHL/FHL/TA/GS motor function intact, exam limited to pain                           10.2   8.58  )-----------( 254      ( 12 Sep 2023 05:30 )             32.5     09-12    141  |  104  |  18  ----------------------------<  167<H>  3.8   |  26  |  0.98    Ca    8.8      12 Sep 2023 05:30        A/P: 48yMale POD# s/p   - Stable  - Pain Control  - DVT ppx:   - PT, WBS:     Ortho Pager [phone] Ortho Note    Pt comfortable without complaints, pain controlled at rest with Dilaudid 2-4mg PO Q4h prn  Denies CP, SOB, N/V, numbness/tingling      Vital Signs Last 24 Hrs  T(C): 37.2 (09-12-23 @ 09:22), Max: 37.2 (09-12-23 @ 09:22)  T(F): 99 (09-12-23 @ 09:22), Max: 99 (09-12-23 @ 09:22)  HR: 90 (09-12-23 @ 09:22) (90 - 94)  BP: 138/69 (09-12-23 @ 09:22) (136/69 - 141/70)  BP(mean): --  RR: 14 (09-12-23 @ 09:22) (14 - 19)  SpO2: 93% (09-12-23 @ 09:22) (93% - 98%)  I&O's Summary    11 Sep 2023 07:01  -  12 Sep 2023 07:00  --------------------------------------------------------  IN: 1100 mL / OUT: 900 mL / NET: 200 mL        General: Pt Alert and oriented, NAD  DSG C/D/I: Aquacel to right knee, and ice packs in place.  Pulses: 2+ bilaterally, skin WWP, capillary refill brisk  Sensation: SILT   Motor: EHL/FHL/TA/GS motor function intact, exam limited to pain                           10.2   8.58  )-----------( 254      ( 12 Sep 2023 05:30 )             32.5     09-12    141  |  104  |  18  ----------------------------<  167<H>  3.8   |  26  |  0.98    Ca    8.8      12 Sep 2023 05:30        A/P: 49yo Male POD#3 s/p R TKA  - Stable  - Pain Control: Dilaudid 2-4mg PO Q4h prn moderate to severe pain, gabapentin 300mg PO TID, tylenol 1000mg PO Q8h, celebrex 200mg PO BID   - DVT ppx: aspirin 81mg PO daily, prasurgrel 10mg PO Daily   - PT, WBS: WBAT  - bowel regimen, IS use, PPI   - consult endocrine patient takes lantus 80mg nightly and metoprolol 1000mg PO BID   - Dispo- pending PT and OT clearance    Ortho Pager [phone] Ortho Note    Pt comfortable without complaints, pain controlled at rest with Dilaudid 2-4mg PO Q4h prn  Denies CP, SOB, N/V, numbness/tingling    Reviewed plan of care with patient at bedside    Vital Signs Last 24 Hrs  T(C): 37.2 (09-12-23 @ 09:22), Max: 37.2 (09-12-23 @ 09:22)  T(F): 99 (09-12-23 @ 09:22), Max: 99 (09-12-23 @ 09:22)  HR: 90 (09-12-23 @ 09:22) (90 - 94)  BP: 138/69 (09-12-23 @ 09:22) (136/69 - 141/70)  BP(mean): --  RR: 14 (09-12-23 @ 09:22) (14 - 19)  SpO2: 93% (09-12-23 @ 09:22) (93% - 98%)  I&O's Summary    11 Sep 2023 07:01  -  12 Sep 2023 07:00  --------------------------------------------------------  IN: 1100 mL / OUT: 900 mL / NET: 200 mL        General: Pt Alert and oriented, NAD  DSG C/D/I: Aquacel to right knee, and ice packs in place.  Pulses: 2+ bilaterally, skin WWP, capillary refill brisk  Sensation: SILT   Motor: EHL/FHL/TA/GS motor function intact, exam limited to pain                           10.2   8.58  )-----------( 254      ( 12 Sep 2023 05:30 )             32.5     09-12    141  |  104  |  18  ----------------------------<  167<H>  3.8   |  26  |  0.98    Ca    8.8      12 Sep 2023 05:30        A/P: 49yo Male POD#3 s/p R TKA  - afebrile  - Pain Control: Dilaudid 2-4mg PO Q4h prn moderate to severe pain, gabapentin 300mg PO TID, tylenol 1000mg PO Q8h, celebrex 200mg PO BID   - DVT ppx: aspirin 81mg PO daily, prasurgrel 10mg PO Daily   - PT, WBS: WBAT  - bowel regimen, IS use, PPI   - appreciate endocrine recs  - Dispo- pending PT and OT clearance    Ortho Pager [phone]

## 2023-09-13 LAB
ANION GAP SERPL CALC-SCNC: 10 MMOL/L — SIGNIFICANT CHANGE UP (ref 5–17)
BUN SERPL-MCNC: 18 MG/DL — SIGNIFICANT CHANGE UP (ref 7–23)
CALCIUM SERPL-MCNC: 8.7 MG/DL — SIGNIFICANT CHANGE UP (ref 8.4–10.5)
CHLORIDE SERPL-SCNC: 104 MMOL/L — SIGNIFICANT CHANGE UP (ref 96–108)
CO2 SERPL-SCNC: 26 MMOL/L — SIGNIFICANT CHANGE UP (ref 22–31)
CREAT SERPL-MCNC: 0.96 MG/DL — SIGNIFICANT CHANGE UP (ref 0.5–1.3)
EGFR: 98 ML/MIN/1.73M2 — SIGNIFICANT CHANGE UP
GLUCOSE BLDC GLUCOMTR-MCNC: 167 MG/DL — HIGH (ref 70–99)
GLUCOSE BLDC GLUCOMTR-MCNC: 172 MG/DL — HIGH (ref 70–99)
GLUCOSE BLDC GLUCOMTR-MCNC: 191 MG/DL — HIGH (ref 70–99)
GLUCOSE BLDC GLUCOMTR-MCNC: 196 MG/DL — HIGH (ref 70–99)
GLUCOSE SERPL-MCNC: 212 MG/DL — HIGH (ref 70–99)
HCT VFR BLD CALC: 32.3 % — LOW (ref 39–50)
HGB BLD-MCNC: 10.2 G/DL — LOW (ref 13–17)
MCHC RBC-ENTMCNC: 27.2 PG — SIGNIFICANT CHANGE UP (ref 27–34)
MCHC RBC-ENTMCNC: 31.6 GM/DL — LOW (ref 32–36)
MCV RBC AUTO: 86.1 FL — SIGNIFICANT CHANGE UP (ref 80–100)
NRBC # BLD: 0 /100 WBCS — SIGNIFICANT CHANGE UP (ref 0–0)
PLATELET # BLD AUTO: 272 K/UL — SIGNIFICANT CHANGE UP (ref 150–400)
POTASSIUM SERPL-MCNC: 3.9 MMOL/L — SIGNIFICANT CHANGE UP (ref 3.5–5.3)
POTASSIUM SERPL-SCNC: 3.9 MMOL/L — SIGNIFICANT CHANGE UP (ref 3.5–5.3)
RBC # BLD: 3.75 M/UL — LOW (ref 4.2–5.8)
RBC # FLD: 13.6 % — SIGNIFICANT CHANGE UP (ref 10.3–14.5)
SODIUM SERPL-SCNC: 140 MMOL/L — SIGNIFICANT CHANGE UP (ref 135–145)
WBC # BLD: 6.64 K/UL — SIGNIFICANT CHANGE UP (ref 3.8–10.5)
WBC # FLD AUTO: 6.64 K/UL — SIGNIFICANT CHANGE UP (ref 3.8–10.5)

## 2023-09-13 RX ORDER — INSULIN GLARGINE 100 [IU]/ML
4 INJECTION, SOLUTION SUBCUTANEOUS AT BEDTIME
Refills: 0 | Status: DISCONTINUED | OUTPATIENT
Start: 2023-09-13 | End: 2023-09-15

## 2023-09-13 RX ADMIN — ATORVASTATIN CALCIUM 40 MILLIGRAM(S): 80 TABLET, FILM COATED ORAL at 21:42

## 2023-09-13 RX ADMIN — HYDROMORPHONE HYDROCHLORIDE 0.5 MILLIGRAM(S): 2 INJECTION INTRAMUSCULAR; INTRAVENOUS; SUBCUTANEOUS at 23:13

## 2023-09-13 RX ADMIN — HYDROMORPHONE HYDROCHLORIDE 4 MILLIGRAM(S): 2 INJECTION INTRAMUSCULAR; INTRAVENOUS; SUBCUTANEOUS at 10:05

## 2023-09-13 RX ADMIN — Medication 1000 MILLIGRAM(S): at 13:34

## 2023-09-13 RX ADMIN — HYDROMORPHONE HYDROCHLORIDE 4 MILLIGRAM(S): 2 INJECTION INTRAMUSCULAR; INTRAVENOUS; SUBCUTANEOUS at 09:08

## 2023-09-13 RX ADMIN — HYDROMORPHONE HYDROCHLORIDE 0.5 MILLIGRAM(S): 2 INJECTION INTRAMUSCULAR; INTRAVENOUS; SUBCUTANEOUS at 18:42

## 2023-09-13 RX ADMIN — INSULIN GLARGINE 4 UNIT(S): 100 INJECTION, SOLUTION SUBCUTANEOUS at 22:40

## 2023-09-13 RX ADMIN — GABAPENTIN 300 MILLIGRAM(S): 400 CAPSULE ORAL at 05:02

## 2023-09-13 RX ADMIN — AMLODIPINE BESYLATE 5 MILLIGRAM(S): 2.5 TABLET ORAL at 05:02

## 2023-09-13 RX ADMIN — Medication 2: at 07:17

## 2023-09-13 RX ADMIN — HYDROMORPHONE HYDROCHLORIDE 0.5 MILLIGRAM(S): 2 INJECTION INTRAMUSCULAR; INTRAVENOUS; SUBCUTANEOUS at 13:10

## 2023-09-13 RX ADMIN — HYDROMORPHONE HYDROCHLORIDE 0.5 MILLIGRAM(S): 2 INJECTION INTRAMUSCULAR; INTRAVENOUS; SUBCUTANEOUS at 12:44

## 2023-09-13 RX ADMIN — Medication 81 MILLIGRAM(S): at 12:43

## 2023-09-13 RX ADMIN — HYDROMORPHONE HYDROCHLORIDE 0.5 MILLIGRAM(S): 2 INJECTION INTRAMUSCULAR; INTRAVENOUS; SUBCUTANEOUS at 06:31

## 2023-09-13 RX ADMIN — Medication 1000 MILLIGRAM(S): at 21:42

## 2023-09-13 RX ADMIN — GABAPENTIN 300 MILLIGRAM(S): 400 CAPSULE ORAL at 13:33

## 2023-09-13 RX ADMIN — HYDROMORPHONE HYDROCHLORIDE 4 MILLIGRAM(S): 2 INJECTION INTRAMUSCULAR; INTRAVENOUS; SUBCUTANEOUS at 06:00

## 2023-09-13 RX ADMIN — GABAPENTIN 300 MILLIGRAM(S): 400 CAPSULE ORAL at 21:42

## 2023-09-13 RX ADMIN — HYDROMORPHONE HYDROCHLORIDE 4 MILLIGRAM(S): 2 INJECTION INTRAMUSCULAR; INTRAVENOUS; SUBCUTANEOUS at 21:41

## 2023-09-13 RX ADMIN — PRASUGREL 10 MILLIGRAM(S): 5 TABLET, FILM COATED ORAL at 13:22

## 2023-09-13 RX ADMIN — Medication 2: at 17:21

## 2023-09-13 RX ADMIN — CELECOXIB 200 MILLIGRAM(S): 200 CAPSULE ORAL at 05:02

## 2023-09-13 RX ADMIN — HYDROMORPHONE HYDROCHLORIDE 4 MILLIGRAM(S): 2 INJECTION INTRAMUSCULAR; INTRAVENOUS; SUBCUTANEOUS at 22:25

## 2023-09-13 RX ADMIN — Medication 2: at 22:42

## 2023-09-13 RX ADMIN — HYDROMORPHONE HYDROCHLORIDE 0.5 MILLIGRAM(S): 2 INJECTION INTRAMUSCULAR; INTRAVENOUS; SUBCUTANEOUS at 22:43

## 2023-09-13 RX ADMIN — HYDROMORPHONE HYDROCHLORIDE 4 MILLIGRAM(S): 2 INJECTION INTRAMUSCULAR; INTRAVENOUS; SUBCUTANEOUS at 17:21

## 2023-09-13 RX ADMIN — HYDROMORPHONE HYDROCHLORIDE 4 MILLIGRAM(S): 2 INJECTION INTRAMUSCULAR; INTRAVENOUS; SUBCUTANEOUS at 05:02

## 2023-09-13 RX ADMIN — Medication 1000 MILLIGRAM(S): at 05:02

## 2023-09-13 RX ADMIN — HYDROMORPHONE HYDROCHLORIDE 0.5 MILLIGRAM(S): 2 INJECTION INTRAMUSCULAR; INTRAVENOUS; SUBCUTANEOUS at 18:20

## 2023-09-13 RX ADMIN — PANTOPRAZOLE SODIUM 40 MILLIGRAM(S): 20 TABLET, DELAYED RELEASE ORAL at 05:02

## 2023-09-13 RX ADMIN — HYDROMORPHONE HYDROCHLORIDE 0.5 MILLIGRAM(S): 2 INJECTION INTRAMUSCULAR; INTRAVENOUS; SUBCUTANEOUS at 06:11

## 2023-09-13 RX ADMIN — Medication 2: at 12:29

## 2023-09-13 RX ADMIN — CELECOXIB 200 MILLIGRAM(S): 200 CAPSULE ORAL at 17:21

## 2023-09-13 NOTE — DIETITIAN INITIAL EVALUATION ADULT - ADD RECOMMEND
1. Continue with current diet order (consistent carbohydrate diet with no snacks)  2. Encourage pt to meet nutritional needs as able  3. Monitor PO intakes, trend weights (biweekly), monitor skin integrity, monitor labs (electrolytes, CMP), monitor GI fxn   4. Encourage adherence to diet education (reinforce as able)  5. Continue insulin regimen to promote euglycemia  6. Pain and bowel regimen per team  7. Will continue to assess/honor preferences as able   8. Align nutrition interventions with goals of care at all times

## 2023-09-13 NOTE — DIETITIAN INITIAL EVALUATION ADULT - OTHER INFO
48y Male s/p right total knee replacement  PMHx: DM, anxiety, GERD, CAD    Pt seen in room for nutrition assessment. Pt reports fair appetite PTA and during hospital stay. As per diet recall PTA: pt endorsed he eats "okay," eats a variety of foods, cooks at home, his kids help at times with meal preparation as well. Currently on consistent carbohydrate diet with no snacks, tolerating fairly well, noted with fair, ~50-75% PO intakes overall. No cultural, Baptism, or ethnic food preferences noted. No known food allergies. Denies wt changes, reports wt stability at current wt. Dosing wt: 221 pounds, Ideal body weight: 130 pounds, pt is 170% of ideal body weight. Denies nausea, vomiting, diarrhea, constipation, last BM on 9/12/23. Noted with trace, R knee edema. Skin: surgical incision to R knee. Abraham: 20. No issues chewing or swallowing noted. Noted with some pain relief from level 8-10 to 4-5. Labs reviewed: elevated POCT BG (172), serum Glucose (167); RD to continue to monitor trends. Nutritionally pertinent medications/supplements: insulin, zofran, miralax, senna, protonix. RD observed pt with no overt signs of muscle or fat wasting. Based on ASPEN guidelines, pt does not meet criteria for malnutrition at this time. Pt amenable to education; RD provided education in regards to the importance of adequate macro and micronutrients, as well as hydration to support ADLs, maintain energy levels and overall functional/nutritional status. General healthful education provided. Nutrient-dense foods promoted. RD discussed pt's elevated nutrient needs related to postoperative demands, emphasizing the role that protein plays in the healing process. Pt was receptive and verbalized understanding. Provided Information on [facility] [facility]. Pt was receptive and verbalized understanding. No additional nutrition-related concerns. Will continue to follow per RD protocol. Additional nutrition recommendations below to follow.

## 2023-09-13 NOTE — PROGRESS NOTE ADULT - SUBJECTIVE AND OBJECTIVE BOX
SUBJECTIVE / INTERVAL HPI: Patient was seen and examined this morning. He is ambulating. Continues to complain of knee pain, not improving. He grazes on his breakfast throughout the day and then eats dinner late at night.    CAPILLARY BLOOD GLUCOSE & INSULIN RECEIVED  162 mg/dL (09-12 @ 17:07) - Lispro 2. Ate steak, potatoes and asparagus.  169 mg/dL (09-12 @ 22:18) - Lispro 2  212 mg/dL (09-13 @ 05:30)  172 mg/dL (09-13 @ 06:54) - Lispro 2  196 mg/dL (09-13 @ 11:56) - Lispro 2    REVIEW OF SYSTEMS  Constitutional:  Negative fever, chills or loss of appetite.  Eyes:  Negative blurry vision or double vision.  Cardiovascular:  Negative for chest pain or palpitations.  Respiratory:  Negative for cough, wheezing, or shortness of breath.    Gastrointestinal:  Negative for nausea, vomiting, diarrhea, constipation, or abdominal pain.  Genitourinary:  Negative frequency, urgency or dysuria.  Neurologic:  No headache, confusion, dizziness, lightheadedness.    PHYSICAL EXAM  Vital Signs Last 24 Hrs  T(C): 37 (13 Sep 2023 12:44), Max: 37.6 (12 Sep 2023 21:39)  T(F): 98.6 (13 Sep 2023 12:44), Max: 99.6 (12 Sep 2023 21:39)  HR: 90 (13 Sep 2023 13:10) (89 - 125)  BP: 136/86 (13 Sep 2023 13:10) (124/71 - 147/90)  BP(mean): --  RR: 18 (13 Sep 2023 12:44) (17 - 20)  SpO2: 95% (13 Sep 2023 12:44) (93% - 98%)    Parameters below as of 13 Sep 2023 12:44  Patient On (Oxygen Delivery Method): room air    Constitutional: Awake, alert, in no acute distress.   HEENT: Normocephalic, atraumatic, JAMEE.  Respiratory: Lungs clear to ausculation bilaterally.   Cardiovascular: regular rhythm, normal S1 and S2, no audible murmurs.   GI: soft, non-tender, non-distended, bowel sounds present.  Extremities: No lower extremity edema.  Psychiatric: AAO x 3. Normal affect/mood.     LABS  CBC - WBC/HGB/HTC/PLT: 6.64/10.2/32.3/272 (09-13-23)  BMP - Na/K/Cl/Bicarb/BUN/Cr/Gluc/AG/eGFR: 140/3.9/104/26/18/0.96/212/10/98 (09-13-23)  Ca - 8.7 (09-13-23)  Phos - -- (09-13-23)  Mg - -- (09-13-23)      MEDICATIONS  MEDICATIONS  (STANDING):  acetaminophen     Tablet .. 1000 milliGRAM(s) Oral every 8 hours  amLODIPine   Tablet 5 milliGRAM(s) Oral daily  aspirin enteric coated 81 milliGRAM(s) Oral daily  atorvastatin 40 milliGRAM(s) Oral at bedtime  celecoxib 200 milliGRAM(s) Oral every 12 hours  dextrose 5%. 1000 milliLiter(s) (100 mL/Hr) IV Continuous <Continuous>  dextrose 5%. 1000 milliLiter(s) (50 mL/Hr) IV Continuous <Continuous>  dextrose 50% Injectable 25 Gram(s) IV Push once  dextrose 50% Injectable 12.5 Gram(s) IV Push once  dextrose 50% Injectable 25 Gram(s) IV Push once  gabapentin 300 milliGRAM(s) Oral three times a day  glucagon  Injectable 1 milliGRAM(s) IntraMuscular once  influenza   Vaccine 0.5 milliLiter(s) IntraMuscular once  insulin lispro (ADMELOG) corrective regimen sliding scale   SubCutaneous Before meals and at bedtime  pantoprazole    Tablet 40 milliGRAM(s) Oral before breakfast  polyethylene glycol 3350 17 Gram(s) Oral at bedtime  prasugrel 10 milliGRAM(s) Oral daily  senna 2 Tablet(s) Oral at bedtime    MEDICATIONS  (PRN):  dextrose Oral Gel 15 Gram(s) Oral once PRN Blood Glucose LESS THAN 70 milliGRAM(s)/deciliter  HYDROmorphone   Tablet 4 milliGRAM(s) Oral every 4 hours PRN Severe Pain (7 - 10)  HYDROmorphone   Tablet 2 milliGRAM(s) Oral every 4 hours PRN Moderate Pain (4 - 6)  HYDROmorphone  Injectable 0.5 milliGRAM(s) IV Push every 2 hours PRN breakthrough pain  magnesium hydroxide Suspension 30 milliLiter(s) Oral daily PRN Constipation  ondansetron Injectable 4 milliGRAM(s) IV Push every 6 hours PRN Nausea and/or Vomiting

## 2023-09-13 NOTE — PROGRESS NOTE ADULT - SUBJECTIVE AND OBJECTIVE BOX
Orthopaedic Surgery Progress Note    Patient seen and examined. DOROTHY. Patient without complaints. Pain controlled. Denies CP, SOB, N/V, tactile fevers, calf pain.  Pt is POD #5 s/p right total knee replacement       Vital Signs Last 24 Hrs  T(C): 37.4 (13 Sep 2023 09:00), Max: 37.6 (12 Sep 2023 21:39)  T(F): 99.4 (13 Sep 2023 09:00), Max: 99.6 (12 Sep 2023 21:39)  HR: 97 (13 Sep 2023 09:00) (94 - 104)  BP: 135/74 (13 Sep 2023 09:00) (124/71 - 147/90)  BP(mean): --  RR: 17 (13 Sep 2023 09:00) (17 - 20)  SpO2: 96% (13 Sep 2023 09:00) (93% - 98%)    Parameters below as of 13 Sep 2023 09:00  Patient On (Oxygen Delivery Method): room air             CAPILLARY BLOOD GLUCOSE      POCT Blood Glucose.: 196 mg/dL (13 Sep 2023 11:56)  POCT Blood Glucose.: 172 mg/dL (13 Sep 2023 06:54)  POCT Blood Glucose.: 169 mg/dL (12 Sep 2023 22:18)  POCT Blood Glucose.: 162 mg/dL (12 Sep 2023 17:07)                  Physical Exam:  Pt laying comfortably in bed, NAD.  Skin warm and well perfused, no visible erythema/ecchymoses.  Dressing C/D/I  EHL/FHL/TA/GS firing bilateral lower extremities  SLT In tact to distal bilateral lower extremities  Calves soft and nontender to palpation    LABS                        10.2   6.64  )-----------( 272      ( 13 Sep 2023 05:30 )             32.3                                09-13    140  |  104  |  18  ----------------------------<  212<H>  3.9   |  26  |  0.96    Ca    8.7      13 Sep 2023 05:30      A/P: 48M POD #5 s/p right TKR     CONTINUE:        1. PT - WBAT   2. DVT prophylaxis: Effient, ASA, SCDs  3. Pain Control as needed   4. Dispo: Home Friday

## 2023-09-13 NOTE — DIETITIAN INITIAL EVALUATION ADULT - PERSON TAUGHT/METHOD
Pt amenable to education; RD provided education in regards to the importance of adequate macro and micronutrients, as well as hydration to support ADLs, maintain energy levels and overall functional/nutritional status. General healthful education provided. Nutrient-dense foods promoted. RD discussed pt's elevated nutrient needs related to postoperative demands, emphasizing the role that protein plays in the healing process. Pt was receptive and verbalized understanding. Provided Information on [facility]. Pt was receptive and verbalized understanding./verbal instruction/patient instructed

## 2023-09-13 NOTE — DISCHARGE NOTE NURSING/CASE MANAGEMENT/SOCIAL WORK - PATIENT PORTAL LINK FT
You can access the FollowMyHealth Patient Portal offered by [facility] by registering at the following website: [email or web address]. By joining [email or web address] FollowMyHealth portal, you will also be able to view your health information using other applications (apps) compatible with our system.

## 2023-09-13 NOTE — DIETITIAN INITIAL EVALUATION ADULT - OTHER CALCULATIONS
Based on Standards of Care pt >% ideal body weight, thus ideal body weight used for all calculations. Needs adjusted based on age, postoperative status, clinical condition.

## 2023-09-13 NOTE — DIETITIAN INITIAL EVALUATION ADULT - PROBLEM SELECTOR PLAN 1
Admit to Orthopaedic Service.  Presents today for elective right TKA   Pt medically stable and cleared for procedure today by Dr. Galvez, Dr. Doss  Pt last took Effient on 9/2/23

## 2023-09-13 NOTE — DIETITIAN INITIAL EVALUATION ADULT - PERTINENT LABORATORY DATA
09-13    140  |  104  |  18  ----------------------------<  212<H>  3.9   |  26  |  0.96    Ca    8.7      13 Sep 2023 05:30    POCT Blood Glucose.: 196 mg/dL (09-13-23 @ 11:56)  A1C with Estimated Average Glucose Result: 7.5 % (09-09-23 @ 06:17)

## 2023-09-13 NOTE — DIETITIAN INITIAL EVALUATION ADULT - NUTRITIONGOAL OUTCOME1
Pt to consistently meet at least 75% of EEE via tolerated route that is consistent with goals of care during hospital stay

## 2023-09-13 NOTE — DIETITIAN INITIAL EVALUATION ADULT - PERTINENT MEDS FT
MEDICATIONS  (STANDING):  acetaminophen     Tablet .. 1000 milliGRAM(s) Oral every 8 hours  amLODIPine   Tablet 5 milliGRAM(s) Oral daily  aspirin enteric coated 81 milliGRAM(s) Oral daily  atorvastatin 40 milliGRAM(s) Oral at bedtime  celecoxib 200 milliGRAM(s) Oral every 12 hours  dextrose 5%. 1000 milliLiter(s) (100 mL/Hr) IV Continuous <Continuous>  dextrose 5%. 1000 milliLiter(s) (50 mL/Hr) IV Continuous <Continuous>  dextrose 50% Injectable 25 Gram(s) IV Push once  dextrose 50% Injectable 12.5 Gram(s) IV Push once  dextrose 50% Injectable 25 Gram(s) IV Push once  gabapentin 300 milliGRAM(s) Oral three times a day  glucagon  Injectable 1 milliGRAM(s) IntraMuscular once  influenza   Vaccine 0.5 milliLiter(s) IntraMuscular once  insulin lispro (ADMELOG) corrective regimen sliding scale   SubCutaneous Before meals and at bedtime  pantoprazole    Tablet 40 milliGRAM(s) Oral before breakfast  polyethylene glycol 3350 17 Gram(s) Oral at bedtime  prasugrel 10 milliGRAM(s) Oral daily  senna 2 Tablet(s) Oral at bedtime    MEDICATIONS  (PRN):  dextrose Oral Gel 15 Gram(s) Oral once PRN Blood Glucose LESS THAN 70 milliGRAM(s)/deciliter  HYDROmorphone   Tablet 4 milliGRAM(s) Oral every 4 hours PRN Severe Pain (7 - 10)  HYDROmorphone   Tablet 2 milliGRAM(s) Oral every 4 hours PRN Moderate Pain (4 - 6)  HYDROmorphone  Injectable 0.5 milliGRAM(s) IV Push every 2 hours PRN breakthrough pain  magnesium hydroxide Suspension 30 milliLiter(s) Oral daily PRN Constipation  ondansetron Injectable 4 milliGRAM(s) IV Push every 6 hours PRN Nausea and/or Vomiting

## 2023-09-13 NOTE — PROGRESS NOTE ADULT - PROBLEM SELECTOR PLAN 1
Type 2 diabetes mellitus with hyperglycemia  - Continue lispro moderate dose sliding scale before meals and at bedtime.  - Patient's fingerstick glucose goal is 100-180 mg/dL.    - Change diet to consistent carb.  - For discharge: TBD  - Patient can follow up with his private endocrinologist.     Case discussed with Dr Olson. Type 2 diabetes mellitus with hyperglycemia  - Start Lantus 4 units at bedtime.  - Continue lispro moderate dose sliding scale before meals and at bedtime.  - Patient's fingerstick glucose goal is 100-180 mg/dL.    - Consistent carb diet  - For discharge: TBD  - Patient can follow up with his private endocrinologist.     Case discussed with Dr Olson.

## 2023-09-14 LAB
ANION GAP SERPL CALC-SCNC: 11 MMOL/L — SIGNIFICANT CHANGE UP (ref 5–17)
BUN SERPL-MCNC: 17 MG/DL — SIGNIFICANT CHANGE UP (ref 7–23)
CALCIUM SERPL-MCNC: 9 MG/DL — SIGNIFICANT CHANGE UP (ref 8.4–10.5)
CHLORIDE SERPL-SCNC: 102 MMOL/L — SIGNIFICANT CHANGE UP (ref 96–108)
CO2 SERPL-SCNC: 27 MMOL/L — SIGNIFICANT CHANGE UP (ref 22–31)
CREAT SERPL-MCNC: 0.85 MG/DL — SIGNIFICANT CHANGE UP (ref 0.5–1.3)
EGFR: 108 ML/MIN/1.73M2 — SIGNIFICANT CHANGE UP
GLUCOSE BLDC GLUCOMTR-MCNC: 172 MG/DL — HIGH (ref 70–99)
GLUCOSE BLDC GLUCOMTR-MCNC: 174 MG/DL — HIGH (ref 70–99)
GLUCOSE BLDC GLUCOMTR-MCNC: 203 MG/DL — HIGH (ref 70–99)
GLUCOSE BLDC GLUCOMTR-MCNC: 213 MG/DL — HIGH (ref 70–99)
GLUCOSE SERPL-MCNC: 170 MG/DL — HIGH (ref 70–99)
HCT VFR BLD CALC: 31.7 % — LOW (ref 39–50)
HGB BLD-MCNC: 10.3 G/DL — LOW (ref 13–17)
MCHC RBC-ENTMCNC: 27.5 PG — SIGNIFICANT CHANGE UP (ref 27–34)
MCHC RBC-ENTMCNC: 32.5 GM/DL — SIGNIFICANT CHANGE UP (ref 32–36)
MCV RBC AUTO: 84.5 FL — SIGNIFICANT CHANGE UP (ref 80–100)
NRBC # BLD: 0 /100 WBCS — SIGNIFICANT CHANGE UP (ref 0–0)
PLATELET # BLD AUTO: 292 K/UL — SIGNIFICANT CHANGE UP (ref 150–400)
POTASSIUM SERPL-MCNC: 3.8 MMOL/L — SIGNIFICANT CHANGE UP (ref 3.5–5.3)
POTASSIUM SERPL-SCNC: 3.8 MMOL/L — SIGNIFICANT CHANGE UP (ref 3.5–5.3)
RBC # BLD: 3.75 M/UL — LOW (ref 4.2–5.8)
RBC # FLD: 13.2 % — SIGNIFICANT CHANGE UP (ref 10.3–14.5)
SODIUM SERPL-SCNC: 140 MMOL/L — SIGNIFICANT CHANGE UP (ref 135–145)
WBC # BLD: 6.65 K/UL — SIGNIFICANT CHANGE UP (ref 3.8–10.5)
WBC # FLD AUTO: 6.65 K/UL — SIGNIFICANT CHANGE UP (ref 3.8–10.5)

## 2023-09-14 RX ORDER — LINAGLIPTIN 5 MG/1
5 TABLET, FILM COATED ORAL DAILY
Refills: 0 | Status: DISCONTINUED | OUTPATIENT
Start: 2023-09-14 | End: 2023-09-15

## 2023-09-14 RX ORDER — METFORMIN HYDROCHLORIDE 850 MG/1
500 TABLET ORAL
Refills: 0 | Status: DISCONTINUED | OUTPATIENT
Start: 2023-09-14 | End: 2023-09-15

## 2023-09-14 RX ADMIN — HYDROMORPHONE HYDROCHLORIDE 0.5 MILLIGRAM(S): 2 INJECTION INTRAMUSCULAR; INTRAVENOUS; SUBCUTANEOUS at 17:31

## 2023-09-14 RX ADMIN — HYDROMORPHONE HYDROCHLORIDE 0.5 MILLIGRAM(S): 2 INJECTION INTRAMUSCULAR; INTRAVENOUS; SUBCUTANEOUS at 22:10

## 2023-09-14 RX ADMIN — HYDROMORPHONE HYDROCHLORIDE 4 MILLIGRAM(S): 2 INJECTION INTRAMUSCULAR; INTRAVENOUS; SUBCUTANEOUS at 16:13

## 2023-09-14 RX ADMIN — CELECOXIB 200 MILLIGRAM(S): 200 CAPSULE ORAL at 17:31

## 2023-09-14 RX ADMIN — ATORVASTATIN CALCIUM 40 MILLIGRAM(S): 80 TABLET, FILM COATED ORAL at 21:05

## 2023-09-14 RX ADMIN — HYDROMORPHONE HYDROCHLORIDE 0.5 MILLIGRAM(S): 2 INJECTION INTRAMUSCULAR; INTRAVENOUS; SUBCUTANEOUS at 07:34

## 2023-09-14 RX ADMIN — Medication 4: at 17:35

## 2023-09-14 RX ADMIN — AMLODIPINE BESYLATE 5 MILLIGRAM(S): 2.5 TABLET ORAL at 06:33

## 2023-09-14 RX ADMIN — Medication 2: at 07:49

## 2023-09-14 RX ADMIN — METFORMIN HYDROCHLORIDE 500 MILLIGRAM(S): 850 TABLET ORAL at 17:31

## 2023-09-14 RX ADMIN — HYDROMORPHONE HYDROCHLORIDE 4 MILLIGRAM(S): 2 INJECTION INTRAMUSCULAR; INTRAVENOUS; SUBCUTANEOUS at 02:15

## 2023-09-14 RX ADMIN — PRASUGREL 10 MILLIGRAM(S): 5 TABLET, FILM COATED ORAL at 12:46

## 2023-09-14 RX ADMIN — Medication 1000 MILLIGRAM(S): at 06:33

## 2023-09-14 RX ADMIN — Medication 81 MILLIGRAM(S): at 13:23

## 2023-09-14 RX ADMIN — LINAGLIPTIN 5 MILLIGRAM(S): 5 TABLET, FILM COATED ORAL at 17:31

## 2023-09-14 RX ADMIN — HYDROMORPHONE HYDROCHLORIDE 4 MILLIGRAM(S): 2 INJECTION INTRAMUSCULAR; INTRAVENOUS; SUBCUTANEOUS at 06:33

## 2023-09-14 RX ADMIN — GABAPENTIN 300 MILLIGRAM(S): 400 CAPSULE ORAL at 06:33

## 2023-09-14 RX ADMIN — Medication 1000 MILLIGRAM(S): at 14:42

## 2023-09-14 RX ADMIN — Medication 1000 MILLIGRAM(S): at 21:05

## 2023-09-14 RX ADMIN — HYDROMORPHONE HYDROCHLORIDE 4 MILLIGRAM(S): 2 INJECTION INTRAMUSCULAR; INTRAVENOUS; SUBCUTANEOUS at 01:44

## 2023-09-14 RX ADMIN — HYDROMORPHONE HYDROCHLORIDE 0.5 MILLIGRAM(S): 2 INJECTION INTRAMUSCULAR; INTRAVENOUS; SUBCUTANEOUS at 21:22

## 2023-09-14 RX ADMIN — HYDROMORPHONE HYDROCHLORIDE 0.5 MILLIGRAM(S): 2 INJECTION INTRAMUSCULAR; INTRAVENOUS; SUBCUTANEOUS at 08:00

## 2023-09-14 RX ADMIN — Medication 4: at 22:11

## 2023-09-14 RX ADMIN — HYDROMORPHONE HYDROCHLORIDE 4 MILLIGRAM(S): 2 INJECTION INTRAMUSCULAR; INTRAVENOUS; SUBCUTANEOUS at 16:45

## 2023-09-14 RX ADMIN — HYDROMORPHONE HYDROCHLORIDE 0.5 MILLIGRAM(S): 2 INJECTION INTRAMUSCULAR; INTRAVENOUS; SUBCUTANEOUS at 10:17

## 2023-09-14 RX ADMIN — HYDROMORPHONE HYDROCHLORIDE 0.5 MILLIGRAM(S): 2 INJECTION INTRAMUSCULAR; INTRAVENOUS; SUBCUTANEOUS at 18:00

## 2023-09-14 RX ADMIN — HYDROMORPHONE HYDROCHLORIDE 4 MILLIGRAM(S): 2 INJECTION INTRAMUSCULAR; INTRAVENOUS; SUBCUTANEOUS at 07:15

## 2023-09-14 RX ADMIN — INSULIN GLARGINE 4 UNIT(S): 100 INJECTION, SOLUTION SUBCUTANEOUS at 22:11

## 2023-09-14 RX ADMIN — GABAPENTIN 300 MILLIGRAM(S): 400 CAPSULE ORAL at 21:05

## 2023-09-14 RX ADMIN — HYDROMORPHONE HYDROCHLORIDE 0.5 MILLIGRAM(S): 2 INJECTION INTRAMUSCULAR; INTRAVENOUS; SUBCUTANEOUS at 03:05

## 2023-09-14 RX ADMIN — HYDROMORPHONE HYDROCHLORIDE 0.5 MILLIGRAM(S): 2 INJECTION INTRAMUSCULAR; INTRAVENOUS; SUBCUTANEOUS at 02:48

## 2023-09-14 RX ADMIN — HYDROMORPHONE HYDROCHLORIDE 0.5 MILLIGRAM(S): 2 INJECTION INTRAMUSCULAR; INTRAVENOUS; SUBCUTANEOUS at 10:02

## 2023-09-14 RX ADMIN — GABAPENTIN 300 MILLIGRAM(S): 400 CAPSULE ORAL at 14:42

## 2023-09-14 RX ADMIN — PANTOPRAZOLE SODIUM 40 MILLIGRAM(S): 20 TABLET, DELAYED RELEASE ORAL at 06:32

## 2023-09-14 RX ADMIN — HYDROMORPHONE HYDROCHLORIDE 4 MILLIGRAM(S): 2 INJECTION INTRAMUSCULAR; INTRAVENOUS; SUBCUTANEOUS at 21:21

## 2023-09-14 RX ADMIN — CELECOXIB 200 MILLIGRAM(S): 200 CAPSULE ORAL at 06:32

## 2023-09-14 RX ADMIN — HYDROMORPHONE HYDROCHLORIDE 4 MILLIGRAM(S): 2 INJECTION INTRAMUSCULAR; INTRAVENOUS; SUBCUTANEOUS at 20:21

## 2023-09-14 NOTE — PROGRESS NOTE ADULT - PROBLEM SELECTOR PLAN 1
Type 2 diabetes mellitus with hyperglycemia  - Stop Lantus.  - Start metformin 500mg BID and tradjenta 5mg daily.  - Continue lispro moderate dose sliding scale before meals and at bedtime.  - Patient's fingerstick glucose goal is 100-180 mg/dL.    - Consistent carb diet  - For discharge: TBD  - Patient can follow up with his private endocrinologist.     Case discussed with Dr Olson. Type 2 diabetes mellitus with hyperglycemia  - Stop Lantus.  - Start metformin 500mg BID and tradjenta 5mg daily.  - Continue lispro moderate dose sliding scale before meals and at bedtime.  - Patient's fingerstick glucose goal is 100-180 mg/dL.    - Consistent carb diet  - For discharge: Stop levemir (based on needing minimal basal insulin here). Continue Metformin 1000mg BID and start Januvia 100mg daily (or whichever DDP4 is covered). Patient advised to monitor glucose at home and contact his private endocrinologist if he experiences hyperglycemia for guidance on adjusting medications. Educated on importance of glucose control for prevention of surgical site infections.  - Patient can follow up with his private endocrinologist.     Case discussed with Dr Olson.

## 2023-09-14 NOTE — PROGRESS NOTE ADULT - SUBJECTIVE AND OBJECTIVE BOX
SUBJECTIVE / INTERVAL HPI: Patient was seen and examined this morning. Reports same pain. Eating in same pattern as yesterday. We discussed how he has required little to no basal insulin while here, which suggests that large Levemir dose is largely covering food intake at home, and the increased risk of hypoglycemia on Levemir 80 units if diet is reduced or meal is skipped. He does report some hypoglycemia to 60s at home with symptoms at variable times of the day. Discussed that both Levemir and metformin, when used appropriately, should largely control glucose overnight and in-between meals, and they he would need a different agent in addition to metformin and/or Levemir. Again suggested a GLP-1 as on outpatient for glycemic control and appetite suppression/weight loss. Will restart metformin at 500mg BID to eval for GI SE and start tradjenta.    CAPILLARY BLOOD GLUCOSE & INSULIN RECEIVED  191 mg/dL (09-13 @ 16:49) - Lispro 2. Ate steak, potatoes and asparagus.  167 mg/dL (09-13 @ 22:06) - Lantus 4 + lispro 2  174 mg/dL (09-14 @ 07:46) - Lispro 2  170 mg/dL (09-14 @ 10:00)  172 mg/dL (09-14 @ 11:45) - Lispro 2    REVIEW OF SYSTEMS  Constitutional:  Negative fever, chills or loss of appetite.  Eyes:  Negative blurry vision or double vision.  Cardiovascular:  Negative for chest pain or palpitations.  Respiratory:  Negative for cough, wheezing, or shortness of breath.    Gastrointestinal:  Negative for nausea, vomiting, diarrhea, constipation, or abdominal pain.  Genitourinary:  Negative frequency, urgency or dysuria.  Neurologic:  No headache, confusion, dizziness, lightheadedness.    PHYSICAL EXAM  Vital Signs Last 24 Hrs  T(C): 36.7 (14 Sep 2023 10:08), Max: 37.1 (13 Sep 2023 21:39)  T(F): 98.1 (14 Sep 2023 10:08), Max: 98.8 (13 Sep 2023 21:39)  HR: 92 (14 Sep 2023 10:08) (92 - 102)  BP: 126/68 (14 Sep 2023 10:08) (119/67 - 140/77)  BP(mean): --  RR: 18 (14 Sep 2023 10:08) (17 - 18)  SpO2: 92% (14 Sep 2023 06:30) (92% - 97%)    Parameters below as of 14 Sep 2023 06:30  Patient On (Oxygen Delivery Method): nasal cannula  O2 Flow (L/min): 2    Constitutional: Awake, alert, in no acute distress.   HEENT: Normocephalic, atraumatic, JAMEE.  Respiratory: Lungs clear to ausculation bilaterally.   Cardiovascular: regular rhythm, normal S1 and S2, no audible murmurs.   GI: soft, non-tender, non-distended, bowel sounds present.  Extremities: No lower extremity edema.  Psychiatric: AAO x 3. Normal affect/mood.     LABS  CBC - WBC/HGB/HTC/PLT: 6.65/10.3/31.7/292 (09-14-23)  BMP - Na/K/Cl/Bicarb/BUN/Cr/Gluc/AG/eGFR: 140/3.8/102/27/17/0.85/170/11/108 (09-14-23)  Ca - 9.0 (09-14-23)  Phos - -- (09-14-23)  Mg - -- (09-14-23)    MEDICATIONS  MEDICATIONS  (STANDING):  acetaminophen     Tablet .. 1000 milliGRAM(s) Oral every 8 hours  amLODIPine   Tablet 5 milliGRAM(s) Oral daily  aspirin enteric coated 81 milliGRAM(s) Oral daily  atorvastatin 40 milliGRAM(s) Oral at bedtime  celecoxib 200 milliGRAM(s) Oral every 12 hours  dextrose 5%. 1000 milliLiter(s) (100 mL/Hr) IV Continuous <Continuous>  dextrose 5%. 1000 milliLiter(s) (50 mL/Hr) IV Continuous <Continuous>  dextrose 50% Injectable 25 Gram(s) IV Push once  dextrose 50% Injectable 12.5 Gram(s) IV Push once  dextrose 50% Injectable 25 Gram(s) IV Push once  gabapentin 300 milliGRAM(s) Oral three times a day  glucagon  Injectable 1 milliGRAM(s) IntraMuscular once  influenza   Vaccine 0.5 milliLiter(s) IntraMuscular once  insulin glargine Injectable (LANTUS) 4 Unit(s) SubCutaneous at bedtime  insulin lispro (ADMELOG) corrective regimen sliding scale   SubCutaneous Before meals and at bedtime  linagliptin 5 milliGRAM(s) Oral daily  metFORMIN 500 milliGRAM(s) Oral two times a day  pantoprazole    Tablet 40 milliGRAM(s) Oral before breakfast  polyethylene glycol 3350 17 Gram(s) Oral at bedtime  prasugrel 10 milliGRAM(s) Oral daily  senna 2 Tablet(s) Oral at bedtime    MEDICATIONS  (PRN):  dextrose Oral Gel 15 Gram(s) Oral once PRN Blood Glucose LESS THAN 70 milliGRAM(s)/deciliter  HYDROmorphone   Tablet 4 milliGRAM(s) Oral every 4 hours PRN Severe Pain (7 - 10)  HYDROmorphone   Tablet 2 milliGRAM(s) Oral every 4 hours PRN Moderate Pain (4 - 6)  HYDROmorphone  Injectable 0.5 milliGRAM(s) IV Push every 2 hours PRN breakthrough pain  magnesium hydroxide Suspension 30 milliLiter(s) Oral daily PRN Constipation  ondansetron Injectable 4 milliGRAM(s) IV Push every 6 hours PRN Nausea and/or Vomiting

## 2023-09-14 NOTE — PROGRESS NOTE ADULT - SUBJECTIVE AND OBJECTIVE BOX
Ortho Note    Subjective:  Pt comfortable without complaints, pain controlled  Denies CP, SOB, N/V, numbness/tingling     Vital Signs Last 24 Hrs  T(C): 36.7 (09-14-23 @ 10:08), Max: 36.8 (09-14-23 @ 06:30)  T(F): 98.1 (09-14-23 @ 10:08), Max: 98.3 (09-14-23 @ 06:30)  HR: 92 (09-14-23 @ 10:08) (92 - 94)  BP: 126/68 (09-14-23 @ 10:08) (126/68 - 132/70)  BP(mean): --  RR: 18 (09-14-23 @ 10:08) (18 - 18)  SpO2: 92% (09-14-23 @ 06:30) (92% - 92%)  AVSS    Objective:    Physical Exam:  General: Pt Alert and oriented, NAD  Right knee aquacel DSG C/D/I- with ice pack   Pulses: +2 pedal pulses, wwp toes  Sensation: silt intact bilateral lower extremities  Motor: EHL/FHL/TA/GS- firing         Plan of Care:  A/P: 48yMale POD#5 s/p R TKR  - afebrile  - Pain Control- Dilaudid 2-4mg PO Q4h prn moderate to severe pain, gabapentin 300mg PO TID, tylenol 1000mg PO Q8h, celebrex 200mg PO BID   - DVT ppx: aspirin 81mg PO daily, prasurgrel 10mg PO Daily   - PT, WBS: WBAT  - bowel regimen, IS use, PPI   - appreciate medicine regimen  - appreciate endo recs- start trajenta 5mg PO daily, metformin 500mg PO BID, hold Lantus   - Dispo- home pending pt clearance     Ortho Pager [phone]

## 2023-09-14 NOTE — PROGRESS NOTE ADULT - NS ATTEND AMEND GEN_ALL_CORE FT
He still is having significant knee pain S/P knee surgery. Glucose levels were 191-167 last night and today 174-122. I agree with no need for basal Insulin at this time and discharge onMetformin plus Tradjenta.
Glucose levels were 162-159 last night and today 212(172 FS)-196. I agree with starting 4 units Lantus Insulin and moderate-dose sliding scale

## 2023-09-14 NOTE — PROGRESS NOTE ADULT - ASSESSMENT
48yoM with PMH of type 2 DM, CAD with stents in 2020, HTN, HLD, and depression presents with right knee pain x 6 years, now status post right TKR for osteoarthritis on 9/8.    A1C is close to target of 7%. Minimal hyperglycemia in hospital, though his appetite is not at baseline. He is on a large dose of levemir (with some reported hypoglycemia, so likely a diet blanket) and metformin, which both help more to control the glucose overnight and in-between meals. He likely needs to add a secretagogue to help with mealtime glucose, decreased dose or no levemir, and continue metformin. We discussed GLP-1 therapy (for glycemic control + appetite suppression/weight loss), and he might be amenable but wants to discuss with his endocrinologist.    A1C: 7.5 %  BUN: 17  Creatinine: 0.85  GFR: 108  Weight: 100.3  BMI: 37.9  
48yoM with PMH of type 2 DM, CAD with stents in 2020, HTN, HLD, and depression presents with right knee pain x 6 years, now status post right TKR for osteoarthritis on 9/8.    A1C is close to target of 7%. Minimal hyperglycemia in hospital, though his appetite is not at baseline. He is on a large dose of levemir (though denies hypoglycemia, so likely a diet blanket) and metformin, which both help more to control the glucose overnight and in-between meals. He likely needs to add a secretagogue to help with mealtime glucose, decrease dose of levemir, and continue metformin. We discussed GLP-1 therapy (for glycemic control + appetite suppression/weight loss), and he might be amenable but wants to discuss with his endocrinologist.    A1C: 7.5 %  BUN: 18  Creatinine: 0.96  GFR: 98  Weight: 100.3  BMI: 37.9

## 2023-09-15 VITALS
TEMPERATURE: 98 F | SYSTOLIC BLOOD PRESSURE: 142 MMHG | RESPIRATION RATE: 16 BRPM | DIASTOLIC BLOOD PRESSURE: 74 MMHG | OXYGEN SATURATION: 96 % | HEART RATE: 87 BPM

## 2023-09-15 LAB
ANION GAP SERPL CALC-SCNC: 10 MMOL/L — SIGNIFICANT CHANGE UP (ref 5–17)
BUN SERPL-MCNC: 19 MG/DL — SIGNIFICANT CHANGE UP (ref 7–23)
CALCIUM SERPL-MCNC: 9.3 MG/DL — SIGNIFICANT CHANGE UP (ref 8.4–10.5)
CHLORIDE SERPL-SCNC: 102 MMOL/L — SIGNIFICANT CHANGE UP (ref 96–108)
CO2 SERPL-SCNC: 28 MMOL/L — SIGNIFICANT CHANGE UP (ref 22–31)
CREAT SERPL-MCNC: 0.96 MG/DL — SIGNIFICANT CHANGE UP (ref 0.5–1.3)
EGFR: 98 ML/MIN/1.73M2 — SIGNIFICANT CHANGE UP
GLUCOSE BLDC GLUCOMTR-MCNC: 171 MG/DL — HIGH (ref 70–99)
GLUCOSE BLDC GLUCOMTR-MCNC: 179 MG/DL — HIGH (ref 70–99)
GLUCOSE SERPL-MCNC: 168 MG/DL — HIGH (ref 70–99)
HCT VFR BLD CALC: 33.1 % — LOW (ref 39–50)
HGB BLD-MCNC: 10.5 G/DL — LOW (ref 13–17)
MCHC RBC-ENTMCNC: 27.1 PG — SIGNIFICANT CHANGE UP (ref 27–34)
MCHC RBC-ENTMCNC: 31.7 GM/DL — LOW (ref 32–36)
MCV RBC AUTO: 85.3 FL — SIGNIFICANT CHANGE UP (ref 80–100)
NRBC # BLD: 0 /100 WBCS — SIGNIFICANT CHANGE UP (ref 0–0)
PLATELET # BLD AUTO: 333 K/UL — SIGNIFICANT CHANGE UP (ref 150–400)
POTASSIUM SERPL-MCNC: 4 MMOL/L — SIGNIFICANT CHANGE UP (ref 3.5–5.3)
POTASSIUM SERPL-SCNC: 4 MMOL/L — SIGNIFICANT CHANGE UP (ref 3.5–5.3)
RBC # BLD: 3.88 M/UL — LOW (ref 4.2–5.8)
RBC # FLD: 13.5 % — SIGNIFICANT CHANGE UP (ref 10.3–14.5)
SODIUM SERPL-SCNC: 140 MMOL/L — SIGNIFICANT CHANGE UP (ref 135–145)
WBC # BLD: 7.6 K/UL — SIGNIFICANT CHANGE UP (ref 3.8–10.5)
WBC # FLD AUTO: 7.6 K/UL — SIGNIFICANT CHANGE UP (ref 3.8–10.5)

## 2023-09-15 RX ORDER — METFORMIN HYDROCHLORIDE 850 MG/1
1 TABLET ORAL
Refills: 0 | DISCHARGE

## 2023-09-15 RX ORDER — AMLODIPINE BESYLATE 2.5 MG/1
1 TABLET ORAL
Refills: 0 | DISCHARGE

## 2023-09-15 RX ORDER — SENNA PLUS 8.6 MG/1
2 TABLET ORAL
Qty: 0 | Refills: 0 | DISCHARGE
Start: 2023-09-15

## 2023-09-15 RX ORDER — METFORMIN HYDROCHLORIDE 850 MG/1
1 TABLET ORAL
Qty: 60 | Refills: 0
Start: 2023-09-15 | End: 2023-10-14

## 2023-09-15 RX ORDER — ASPIRIN/CALCIUM CARB/MAGNESIUM 324 MG
1 TABLET ORAL
Qty: 0 | Refills: 0 | DISCHARGE
Start: 2023-09-15

## 2023-09-15 RX ORDER — ATORVASTATIN CALCIUM 80 MG/1
1 TABLET, FILM COATED ORAL
Refills: 0 | DISCHARGE

## 2023-09-15 RX ORDER — PRASUGREL 5 MG/1
1 TABLET, FILM COATED ORAL
Refills: 0 | DISCHARGE

## 2023-09-15 RX ORDER — LINAGLIPTIN 5 MG/1
1 TABLET, FILM COATED ORAL
Qty: 30 | Refills: 0
Start: 2023-09-15 | End: 2023-10-14

## 2023-09-15 RX ORDER — AMLODIPINE BESYLATE 2.5 MG/1
1 TABLET ORAL
Qty: 0 | Refills: 0 | DISCHARGE
Start: 2023-09-15

## 2023-09-15 RX ORDER — ATORVASTATIN CALCIUM 80 MG/1
1 TABLET, FILM COATED ORAL
Qty: 0 | Refills: 0 | DISCHARGE
Start: 2023-09-15

## 2023-09-15 RX ORDER — GABAPENTIN 400 MG/1
1 CAPSULE ORAL
Qty: 21 | Refills: 0
Start: 2023-09-15 | End: 2023-09-21

## 2023-09-15 RX ORDER — ASPIRIN/CALCIUM CARB/MAGNESIUM 324 MG
1 TABLET ORAL
Refills: 0 | DISCHARGE

## 2023-09-15 RX ORDER — ACETAMINOPHEN 500 MG
2 TABLET ORAL
Qty: 0 | Refills: 0 | DISCHARGE
Start: 2023-09-15

## 2023-09-15 RX ORDER — GLIMEPIRIDE 1 MG
1 TABLET ORAL
Refills: 0 | DISCHARGE

## 2023-09-15 RX ORDER — PRASUGREL 5 MG/1
1 TABLET, FILM COATED ORAL
Qty: 0 | Refills: 0 | DISCHARGE
Start: 2023-09-15

## 2023-09-15 RX ADMIN — HYDROMORPHONE HYDROCHLORIDE 0.5 MILLIGRAM(S): 2 INJECTION INTRAMUSCULAR; INTRAVENOUS; SUBCUTANEOUS at 06:15

## 2023-09-15 RX ADMIN — HYDROMORPHONE HYDROCHLORIDE 0.5 MILLIGRAM(S): 2 INJECTION INTRAMUSCULAR; INTRAVENOUS; SUBCUTANEOUS at 11:00

## 2023-09-15 RX ADMIN — HYDROMORPHONE HYDROCHLORIDE 4 MILLIGRAM(S): 2 INJECTION INTRAMUSCULAR; INTRAVENOUS; SUBCUTANEOUS at 09:39

## 2023-09-15 RX ADMIN — PANTOPRAZOLE SODIUM 40 MILLIGRAM(S): 20 TABLET, DELAYED RELEASE ORAL at 05:02

## 2023-09-15 RX ADMIN — Medication 1000 MILLIGRAM(S): at 05:01

## 2023-09-15 RX ADMIN — AMLODIPINE BESYLATE 5 MILLIGRAM(S): 2.5 TABLET ORAL at 05:01

## 2023-09-15 RX ADMIN — HYDROMORPHONE HYDROCHLORIDE 0.5 MILLIGRAM(S): 2 INJECTION INTRAMUSCULAR; INTRAVENOUS; SUBCUTANEOUS at 10:29

## 2023-09-15 RX ADMIN — GABAPENTIN 300 MILLIGRAM(S): 400 CAPSULE ORAL at 05:01

## 2023-09-15 RX ADMIN — Medication 2: at 06:15

## 2023-09-15 RX ADMIN — HYDROMORPHONE HYDROCHLORIDE 0.5 MILLIGRAM(S): 2 INJECTION INTRAMUSCULAR; INTRAVENOUS; SUBCUTANEOUS at 04:01

## 2023-09-15 RX ADMIN — LINAGLIPTIN 5 MILLIGRAM(S): 5 TABLET, FILM COATED ORAL at 11:48

## 2023-09-15 RX ADMIN — PRASUGREL 10 MILLIGRAM(S): 5 TABLET, FILM COATED ORAL at 12:02

## 2023-09-15 RX ADMIN — HYDROMORPHONE HYDROCHLORIDE 0.5 MILLIGRAM(S): 2 INJECTION INTRAMUSCULAR; INTRAVENOUS; SUBCUTANEOUS at 06:40

## 2023-09-15 RX ADMIN — GABAPENTIN 300 MILLIGRAM(S): 400 CAPSULE ORAL at 15:24

## 2023-09-15 RX ADMIN — HYDROMORPHONE HYDROCHLORIDE 4 MILLIGRAM(S): 2 INJECTION INTRAMUSCULAR; INTRAVENOUS; SUBCUTANEOUS at 02:18

## 2023-09-15 RX ADMIN — METFORMIN HYDROCHLORIDE 500 MILLIGRAM(S): 850 TABLET ORAL at 05:01

## 2023-09-15 RX ADMIN — HYDROMORPHONE HYDROCHLORIDE 4 MILLIGRAM(S): 2 INJECTION INTRAMUSCULAR; INTRAVENOUS; SUBCUTANEOUS at 03:27

## 2023-09-15 RX ADMIN — Medication 1000 MILLIGRAM(S): at 15:23

## 2023-09-15 RX ADMIN — HYDROMORPHONE HYDROCHLORIDE 4 MILLIGRAM(S): 2 INJECTION INTRAMUSCULAR; INTRAVENOUS; SUBCUTANEOUS at 09:09

## 2023-09-15 RX ADMIN — Medication 2: at 12:43

## 2023-09-15 RX ADMIN — HYDROMORPHONE HYDROCHLORIDE 0.5 MILLIGRAM(S): 2 INJECTION INTRAMUSCULAR; INTRAVENOUS; SUBCUTANEOUS at 03:27

## 2023-09-15 RX ADMIN — Medication 81 MILLIGRAM(S): at 11:47

## 2023-09-15 RX ADMIN — CELECOXIB 200 MILLIGRAM(S): 200 CAPSULE ORAL at 05:01

## 2023-09-15 NOTE — CHART NOTE - NSCHARTNOTEFT_GEN_A_CORE
Planned for discharge today. Reviewed BG. Metformin 500mg BID and Tradjenta started yesterday + Lantus 4 units HS.    CAPILLARY BLOOD GLUCOSE  POCT Blood Glucose.: 179 mg/dL (15 Sep 2023 11:59)  POCT Blood Glucose.: 171 mg/dL (15 Sep 2023 06:08)  POCT Blood Glucose.: 203 mg/dL (14 Sep 2023 22:04)  POCT Blood Glucose.: 213 mg/dL (14 Sep 2023 17:09)    Assessment and Plan:   48yoM with PMH of type 2 DM, CAD with stents in 2020, HTN, HLD, and depression presents with right knee pain x 6 years, now status post right TKR for osteoarthritis on 9/8.    A1C is close to target of 7%. Minimal hyperglycemia in hospital, though his appetite is not at baseline. He is on a large dose of levemir (with some reported hypoglycemia, so likely a diet blanket) and metformin, which both help more to control the glucose overnight and in-between meals. He likely needs to add a secretagogue to help with mealtime glucose, decreased dose or no levemir, and continue metformin. We discussed GLP-1 therapy (for glycemic control + appetite suppression/weight loss), and he might be amenable but wants to discuss with his endocrinologist.    A1C: 7.5 %  BUN: 17  Creatinine: 0.85  GFR: 108  Weight: 100.3  BMI: 37.9         Problem/Plan - 1:  ·  Problem: Diabetes.   ·  Plan: Type 2 diabetes mellitus with hyperglycemia  - Stop Lantus.  - Start metformin 500mg BID and tradjenta 5mg daily.  - Continue lispro moderate dose sliding scale before meals and at bedtime.  - Patient's fingerstick glucose goal is 100-180 mg/dL.    - Consistent carb diet  - For discharge: Stop levemir (based on needing minimal basal insulin here). Continue Metformin 1000mg BID and start Januvia 100mg daily (or whichever DDP4 is covered). Patient advised to monitor glucose at home and contact his private endocrinologist if he experiences hyperglycemia for guidance on adjusting medications. Educated on importance of glucose control for prevention of surgical site infections.  - Patient can follow up with his private endocrinologist.

## 2023-09-15 NOTE — PROGRESS NOTE ADULT - SUBJECTIVE AND OBJECTIVE BOX
Ortho Note    Subjective:  Pt comfortable without complaints, pain controlled with current pain medication regimen  Denies CP, SOB, N/V, numbness/tingling   Reviewed plan of care with patient at bedside      Vital Signs Last 24 Hrs  T(C): 36.5 (09-15-23 @ 09:55), Max: 36.5 (09-15-23 @ 09:55)  T(F): 97.7 (09-15-23 @ 09:55), Max: 97.7 (09-15-23 @ 09:55)  HR: 87 (09-15-23 @ 09:55) (87 - 89)  BP: 142/74 (09-15-23 @ 09:55) (122/63 - 142/74)  BP(mean): --  RR: 16 (09-15-23 @ 09:55) (16 - 16)  SpO2: 96% (09-15-23 @ 09:55) (96% - 96%)  AVSS    Objective:    Physical Exam:  General: Pt Alert and oriented, NAD  Right knee aquacel DSG C/D/I- with ice pack   Pulses: +2 pedal pulses, wwp toes  Sensation: silt intact bilateral lower extremities  Motor: EHL/FHL/TA/GS- firing           Plan of Care:  A/P: 48yMale POD#6 s/p R TKR  - afebrile  - Pain Control- Dilaudid 2-4mg PO Q4h prn moderate to severe pain, gabapentin 300mg PO TID, tylenol 1000mg PO Q8h, celebrex 200mg PO BID   - DVT ppx: aspirin 81mg PO daily, prasurgrel 10mg PO Daily   - PT, WBS: WBAT  - bowel regimen, IS use, PPI   - appreciate medicine regimen  - appreciate endo recs- start trajenta 5mg PO daily, metformin 500mg PO BID, hold Lantus   - cleared pt 9-14  - Dispo- home dc today 9-15      Ortho Pager [phone]

## 2023-09-15 NOTE — PROGRESS NOTE ADULT - REASON FOR ADMISSION
right knee pain

## 2023-09-15 NOTE — PROGRESS NOTE ADULT - PROVIDER SPECIALTY LIST ADULT
Orthopedics
Endocrinology
Endocrinology

## 2023-09-20 DIAGNOSIS — M17.11 UNILATERAL PRIMARY OSTEOARTHRITIS, RIGHT KNEE: ICD-10-CM

## 2023-09-20 DIAGNOSIS — K21.9 GASTRO-ESOPHAGEAL REFLUX DISEASE WITHOUT ESOPHAGITIS: ICD-10-CM

## 2023-09-20 DIAGNOSIS — E66.9 OBESITY, UNSPECIFIED: ICD-10-CM

## 2023-09-20 DIAGNOSIS — Z79.82 LONG TERM (CURRENT) USE OF ASPIRIN: ICD-10-CM

## 2023-09-20 DIAGNOSIS — Z98.1 ARTHRODESIS STATUS: ICD-10-CM

## 2023-09-20 DIAGNOSIS — F32.A DEPRESSION, UNSPECIFIED: ICD-10-CM

## 2023-09-20 DIAGNOSIS — E11.40 TYPE 2 DIABETES MELLITUS WITH DIABETIC NEUROPATHY, UNSPECIFIED: ICD-10-CM

## 2023-09-20 DIAGNOSIS — Z77.22 CONTACT WITH AND (SUSPECTED) EXPOSURE TO ENVIRONMENTAL TOBACCO SMOKE (ACUTE) (CHRONIC): ICD-10-CM

## 2023-09-20 DIAGNOSIS — Z95.5 PRESENCE OF CORONARY ANGIOPLASTY IMPLANT AND GRAFT: ICD-10-CM

## 2023-09-20 DIAGNOSIS — Z79.84 LONG TERM (CURRENT) USE OF ORAL HYPOGLYCEMIC DRUGS: ICD-10-CM

## 2023-09-20 DIAGNOSIS — E11.65 TYPE 2 DIABETES MELLITUS WITH HYPERGLYCEMIA: ICD-10-CM

## 2023-09-20 DIAGNOSIS — F41.9 ANXIETY DISORDER, UNSPECIFIED: ICD-10-CM

## 2023-09-20 DIAGNOSIS — Z79.4 LONG TERM (CURRENT) USE OF INSULIN: ICD-10-CM

## 2023-09-20 DIAGNOSIS — I25.10 ATHEROSCLEROTIC HEART DISEASE OF NATIVE CORONARY ARTERY WITHOUT ANGINA PECTORIS: ICD-10-CM

## 2023-10-08 PROCEDURE — 36415 COLL VENOUS BLD VENIPUNCTURE: CPT

## 2023-10-08 PROCEDURE — 97161 PT EVAL LOW COMPLEX 20 MIN: CPT

## 2023-10-08 PROCEDURE — 85027 COMPLETE CBC AUTOMATED: CPT

## 2023-10-08 PROCEDURE — S2900: CPT

## 2023-10-08 PROCEDURE — C1713: CPT

## 2023-10-08 PROCEDURE — 83036 HEMOGLOBIN GLYCOSYLATED A1C: CPT

## 2023-10-08 PROCEDURE — C1776: CPT

## 2023-10-08 PROCEDURE — 97530 THERAPEUTIC ACTIVITIES: CPT

## 2023-10-08 PROCEDURE — 97116 GAIT TRAINING THERAPY: CPT

## 2023-10-08 PROCEDURE — 73560 X-RAY EXAM OF KNEE 1 OR 2: CPT

## 2023-10-08 PROCEDURE — 97110 THERAPEUTIC EXERCISES: CPT

## 2023-10-08 PROCEDURE — 82962 GLUCOSE BLOOD TEST: CPT

## 2023-10-08 PROCEDURE — 80048 BASIC METABOLIC PNL TOTAL CA: CPT

## 2024-08-23 NOTE — PHYSICAL THERAPY INITIAL EVALUATION ADULT - SHORT TERM MEMORY, REHAB EVAL
Self referral mammogram clinical report being sent to patient, placed in outgoing mail today. Clinical report also reviewed and noted on report per Dr. [place], provider of choice by patient per signed patient authorization release of mammogram results form. PATITO MeierN, RN - Breast Navigator   
intact

## (undated) DEVICE — SUT ETHILON 3-0 18" PS-1

## (undated) DEVICE — NDL HYPO SAFE 22G X 1.5" (BLACK)

## (undated) DEVICE — MAKO BLADE STANDARD

## (undated) DEVICE — MARKING PEN W RULER

## (undated) DEVICE — MAKO DRAPE KIT

## (undated) DEVICE — SAW BLADE STRYKER SAGITTAL DUAL CUT TEETH 35X64X.64MM

## (undated) DEVICE — ELCTR BOVIE PENCIL BLADE 10FT

## (undated) DEVICE — POLISHER OR CAUTERY TIP

## (undated) DEVICE — NDL 18G BLUNT FILL PINK

## (undated) DEVICE — SAW BLADE STRYKER SAGITTAL 25X86.5X1.32MM

## (undated) DEVICE — SUT STRATAFIX SPIRAL PDO 0 24CM CP-2

## (undated) DEVICE — POSITIONER LEG WRAP STERILE

## (undated) DEVICE — ELCTR AQUAMANTYS BIPOLAR SEALER 6.0

## (undated) DEVICE — SUT STRATAFIX SPIRAL PDO 1 30CM OS-6

## (undated) DEVICE — SUT VICRYL 0 18" CT-1 UNDYED (POP-OFF)

## (undated) DEVICE — SUCTION YANKAUER NO CONTROL VENT

## (undated) DEVICE — SUT STRATAFIX SPIRAL MONOCRYL PLUS 3-0 30CM PS-1 UNDYED

## (undated) DEVICE — WARMING BLANKET UPPER ADULT

## (undated) DEVICE — DRSG AQUACEL 3.5 X 10"

## (undated) DEVICE — SYR ASEPTO

## (undated) DEVICE — GLV 7 PROTEXIS ORTHO (CREAM)

## (undated) DEVICE — DRSG COBAN 6"

## (undated) DEVICE — GLV 7.5 PROTEXIS ORTHO (CREAM)

## (undated) DEVICE — BLADE SCALPEL SAFETYLOCK #15

## (undated) DEVICE — PREP CHLORAPREP HI-LITE ORANGE 26ML

## (undated) DEVICE — SAW BLADE STRYKER SAGITTAL 81.5X12.5X1.19MM

## (undated) DEVICE — STRYKER 4-PORT MANIFOLD W/SPECIMEN COLLECTION

## (undated) DEVICE — POSITIONER FOAM EGG CRATE ULNAR 2PCS (PINK)

## (undated) DEVICE — SYR LUER LOK 50CC

## (undated) DEVICE — MAKO BLADE NARROW

## (undated) DEVICE — SUT VICRYL 0 36" CT-1 UNDYED

## (undated) DEVICE — VENODYNE/SCD SLEEVE CALF MEDIUM

## (undated) DEVICE — DRAPE TOWEL BLUE 17" X 24"

## (undated) DEVICE — DRAPE 3/4 SHEET 52X76"

## (undated) DEVICE — SUT VICRYL 1 27" OS-8 UNDYED

## (undated) DEVICE — DRAPE LIGHT HANDLE COVER (BLUE)

## (undated) DEVICE — SUT VICRYL 2-0 27" CT-1 UNDYED

## (undated) DEVICE — MAKO VIZADISC KNEE TRACKING KIT

## (undated) DEVICE — DRSG STOCKINETTE IMPERVIOUS XL

## (undated) DEVICE — DRAPE LIGHT HANDLE COVER (GREEN)

## (undated) DEVICE — DRAPE U POLY BLUE 60X72"

## (undated) DEVICE — DRAPE 1/2 SHEET 40X57"

## (undated) DEVICE — PACK TOTAL KNEE

## (undated) DEVICE — HOOD T5 PEELAWAY

## (undated) DEVICE — SUT STRATAFIX SYMMETRIC PDS 1 45CM OS-6